# Patient Record
Sex: FEMALE | Race: WHITE | NOT HISPANIC OR LATINO
[De-identification: names, ages, dates, MRNs, and addresses within clinical notes are randomized per-mention and may not be internally consistent; named-entity substitution may affect disease eponyms.]

---

## 2018-10-04 ENCOUNTER — RESULT REVIEW (OUTPATIENT)
Age: 73
End: 2018-10-04

## 2019-05-03 ENCOUNTER — RECORD ABSTRACTING (OUTPATIENT)
Age: 74
End: 2019-05-03

## 2019-05-03 DIAGNOSIS — M19.90 UNSPECIFIED OSTEOARTHRITIS, UNSPECIFIED SITE: ICD-10-CM

## 2019-05-03 DIAGNOSIS — Z86.010 PERSONAL HISTORY OF COLONIC POLYPS: ICD-10-CM

## 2019-05-03 DIAGNOSIS — Z83.79 FAMILY HISTORY OF OTHER DISEASES OF THE DIGESTIVE SYSTEM: ICD-10-CM

## 2019-05-03 DIAGNOSIS — K25.9 GASTRIC ULCER, UNSPECIFIED AS ACUTE OR CHRONIC, W/OUT HEMORRHAGE OR PERFORATION: ICD-10-CM

## 2019-05-03 DIAGNOSIS — Z78.9 OTHER SPECIFIED HEALTH STATUS: ICD-10-CM

## 2019-05-03 DIAGNOSIS — E11.9 TYPE 2 DIABETES MELLITUS W/OUT COMPLICATIONS: ICD-10-CM

## 2019-05-03 DIAGNOSIS — Z87.39 PERSONAL HISTORY OF OTHER DISEASES OF THE MUSCULOSKELETAL SYSTEM AND CONNECTIVE TISSUE: ICD-10-CM

## 2019-05-03 DIAGNOSIS — K21.9 GASTRO-ESOPHAGEAL REFLUX DISEASE W/OUT ESOPHAGITIS: ICD-10-CM

## 2019-05-03 DIAGNOSIS — Z87.01 PERSONAL HISTORY OF PNEUMONIA (RECURRENT): ICD-10-CM

## 2019-05-03 RX ORDER — OLOPATADINE HYDROCHLORIDE 2 MG/ML
0.2 SOLUTION OPHTHALMIC
Refills: 0 | Status: ACTIVE | COMMUNITY

## 2019-05-17 ENCOUNTER — APPOINTMENT (OUTPATIENT)
Dept: GASTROENTEROLOGY | Facility: CLINIC | Age: 74
End: 2019-05-17
Payer: MEDICARE

## 2019-05-17 VITALS
HEART RATE: 74 BPM | TEMPERATURE: 98.2 F | HEIGHT: 64 IN | BODY MASS INDEX: 28.51 KG/M2 | SYSTOLIC BLOOD PRESSURE: 144 MMHG | WEIGHT: 167 LBS | DIASTOLIC BLOOD PRESSURE: 83 MMHG

## 2019-05-17 PROCEDURE — 99214 OFFICE O/P EST MOD 30 MIN: CPT

## 2019-05-17 NOTE — ASSESSMENT
[FreeTextEntry1] : 73 yo female with IBS of alternating constipation and diarrhea. Patient suffers from significant bloating. Will give trial of Xifaxan to see if this gives her relief. Followup in 6 weeks.

## 2019-05-17 NOTE — HISTORY OF PRESENT ILLNESS
[de-identified] : 75 yo female with long history of irritable bowel syndrome and fibromyalgia. The patient has alternating constipation diarrhea which she manages with MiraLax probiotics and fiber. There's been no other changes in her medical history. Colonoscopy done recently was normal.Issues about treatment of irritable bowel discussed with the patient.

## 2019-05-28 ENCOUNTER — RX CHANGE (OUTPATIENT)
Age: 74
End: 2019-05-28

## 2019-05-31 ENCOUNTER — RX CHANGE (OUTPATIENT)
Age: 74
End: 2019-05-31

## 2019-06-19 ENCOUNTER — APPOINTMENT (OUTPATIENT)
Dept: GASTROENTEROLOGY | Facility: CLINIC | Age: 74
End: 2019-06-19
Payer: MEDICARE

## 2019-06-19 VITALS
DIASTOLIC BLOOD PRESSURE: 95 MMHG | BODY MASS INDEX: 28.51 KG/M2 | HEART RATE: 75 BPM | SYSTOLIC BLOOD PRESSURE: 150 MMHG | WEIGHT: 167 LBS | HEIGHT: 64 IN

## 2019-06-19 PROCEDURE — 99213 OFFICE O/P EST LOW 20 MIN: CPT

## 2019-06-19 NOTE — HISTORY OF PRESENT ILLNESS
[de-identified] : 73 yo female with long history of irritable bowel including alternating constipation and diarrhea. Patient does get significant relief from the use of Librax. She does get cramping frequently. Patient is leaving on a trip out of the country. Will start medications on return.

## 2019-06-19 NOTE — ASSESSMENT
[FreeTextEntry1] : 73 yo female with IBS symptoms. CT scan abd/pelvis. Start Elavil 10 mg po qHS on return.

## 2019-06-19 NOTE — PHYSICAL EXAM
[General Appearance - Alert] : alert [General Appearance - In No Acute Distress] : in no acute distress [Auscultation Breath Sounds / Voice Sounds] : lungs were clear to auscultation bilaterally [Heart Rate And Rhythm] : heart rate was normal and rhythm regular [Heart Sounds] : normal S1 and S2 [Heart Sounds Gallop] : no gallops [Murmurs] : no murmurs [Bowel Sounds] : normal bowel sounds [Heart Sounds Pericardial Friction Rub] : no pericardial rub [Abdomen Soft] : soft [Abdomen Tenderness] : non-tender [Abdomen Mass (___ Cm)] : no abdominal mass palpated [] : no hepato-splenomegaly

## 2019-07-17 ENCOUNTER — RX RENEWAL (OUTPATIENT)
Age: 74
End: 2019-07-17

## 2019-07-30 ENCOUNTER — OUTPATIENT (OUTPATIENT)
Dept: OUTPATIENT SERVICES | Facility: HOSPITAL | Age: 74
LOS: 1 days | End: 2019-07-30
Payer: MEDICARE

## 2019-07-30 ENCOUNTER — APPOINTMENT (OUTPATIENT)
Dept: CT IMAGING | Facility: CLINIC | Age: 74
End: 2019-07-30
Payer: MEDICARE

## 2019-07-30 DIAGNOSIS — K58.9 IRRITABLE BOWEL SYNDROME WITHOUT DIARRHEA: ICD-10-CM

## 2019-07-30 PROCEDURE — 74176 CT ABD & PELVIS W/O CONTRAST: CPT | Mod: 26

## 2019-07-30 PROCEDURE — 74176 CT ABD & PELVIS W/O CONTRAST: CPT

## 2019-08-07 ENCOUNTER — APPOINTMENT (OUTPATIENT)
Dept: GASTROENTEROLOGY | Facility: CLINIC | Age: 74
End: 2019-08-07
Payer: MEDICARE

## 2019-08-07 VITALS
WEIGHT: 170 LBS | BODY MASS INDEX: 29.02 KG/M2 | HEART RATE: 83 BPM | DIASTOLIC BLOOD PRESSURE: 88 MMHG | HEIGHT: 64 IN | SYSTOLIC BLOOD PRESSURE: 153 MMHG

## 2019-08-07 DIAGNOSIS — K58.9 IRRITABLE BOWEL SYNDROME W/OUT DIARRHEA: ICD-10-CM

## 2019-08-07 PROCEDURE — 99214 OFFICE O/P EST MOD 30 MIN: CPT

## 2019-08-07 NOTE — PHYSICAL EXAM
[General Appearance - Alert] : alert [General Appearance - In No Acute Distress] : in no acute distress [Auscultation Breath Sounds / Voice Sounds] : lungs were clear to auscultation bilaterally [Heart Rate And Rhythm] : heart rate was normal and rhythm regular [Murmurs] : no murmurs [Heart Sounds Gallop] : no gallops [Heart Sounds] : normal S1 and S2 [Heart Sounds Pericardial Friction Rub] : no pericardial rub [Bowel Sounds] : normal bowel sounds [Abdomen Soft] : soft [] : no hepato-splenomegaly [Abdomen Tenderness] : non-tender [Abdomen Mass (___ Cm)] : no abdominal mass palpated

## 2019-08-07 NOTE — HISTORY OF PRESENT ILLNESS
[de-identified] : Ms. ALBERT RIVER is a 74 year old female with history of ureteral bowel characterized by alternating constipation and diarrhea. Patient has underlying anxiety disorder and also has problems with fibromyalgia. Multiple medications have been ineffective. Workup including colonoscopy laboratory tests CT scan have all been unremarkable.\par

## 2019-08-07 NOTE — ASSESSMENT
[FreeTextEntry1] : 73 yo female with history of IBS and anxiety disorder. Will refer patient for evaluation by psychotherapy and dietary. Follow up in three months.

## 2019-08-13 ENCOUNTER — RX RENEWAL (OUTPATIENT)
Age: 74
End: 2019-08-13

## 2019-09-04 ENCOUNTER — RX RENEWAL (OUTPATIENT)
Age: 74
End: 2019-09-04

## 2019-09-08 ENCOUNTER — TRANSCRIPTION ENCOUNTER (OUTPATIENT)
Age: 74
End: 2019-09-08

## 2019-09-10 ENCOUNTER — RX RENEWAL (OUTPATIENT)
Age: 74
End: 2019-09-10

## 2019-12-14 ENCOUNTER — TRANSCRIPTION ENCOUNTER (OUTPATIENT)
Age: 74
End: 2019-12-14

## 2021-09-20 ENCOUNTER — TRANSCRIPTION ENCOUNTER (OUTPATIENT)
Age: 76
End: 2021-09-20

## 2021-09-27 ENCOUNTER — NON-APPOINTMENT (OUTPATIENT)
Age: 76
End: 2021-09-27

## 2021-09-27 PROBLEM — Z76.89 ESTABLISHING CARE WITH NEW DOCTOR, ENCOUNTER FOR: Status: ACTIVE | Noted: 2021-09-27

## 2021-09-28 ENCOUNTER — APPOINTMENT (OUTPATIENT)
Dept: GYNECOLOGIC ONCOLOGY | Facility: CLINIC | Age: 76
End: 2021-09-28
Payer: MEDICARE

## 2021-09-28 ENCOUNTER — RESULT REVIEW (OUTPATIENT)
Age: 76
End: 2021-09-28

## 2021-09-28 VITALS
HEIGHT: 64 IN | HEART RATE: 73 BPM | DIASTOLIC BLOOD PRESSURE: 83 MMHG | BODY MASS INDEX: 30.56 KG/M2 | SYSTOLIC BLOOD PRESSURE: 156 MMHG | WEIGHT: 179 LBS

## 2021-09-28 DIAGNOSIS — F41.9 ANXIETY DISORDER, UNSPECIFIED: ICD-10-CM

## 2021-09-28 DIAGNOSIS — Z76.89 PERSONS ENCOUNTERING HEALTH SERVICES IN OTHER SPECIFIED CIRCUMSTANCES: ICD-10-CM

## 2021-09-28 DIAGNOSIS — E78.00 PURE HYPERCHOLESTEROLEMIA, UNSPECIFIED: ICD-10-CM

## 2021-09-28 DIAGNOSIS — M35.3 POLYMYALGIA RHEUMATICA: ICD-10-CM

## 2021-09-28 DIAGNOSIS — Z98.890 OTHER SPECIFIED POSTPROCEDURAL STATES: ICD-10-CM

## 2021-09-28 DIAGNOSIS — E11.9 TYPE 2 DIABETES MELLITUS W/OUT COMPLICATIONS: ICD-10-CM

## 2021-09-28 DIAGNOSIS — J30.9 ALLERGIC RHINITIS, UNSPECIFIED: ICD-10-CM

## 2021-09-28 PROCEDURE — 99205 OFFICE O/P NEW HI 60 MIN: CPT

## 2021-09-28 RX ORDER — CONJUGATED ESTROGENS 0.62 MG/G
0.62 CREAM VAGINAL
Refills: 0 | Status: COMPLETED | COMMUNITY
End: 2021-09-28

## 2021-09-28 RX ORDER — SODIUM SULFATE, POTASSIUM SULFATE, MAGNESIUM SULFATE 17.5; 3.13; 1.6 G/ML; G/ML; G/ML
17.5-3.13-1.6 SOLUTION, CONCENTRATE ORAL
Refills: 0 | Status: COMPLETED | COMMUNITY
End: 2021-09-28

## 2021-09-28 RX ORDER — PREDNISONE 1 MG/1
1 TABLET ORAL
Qty: 3 | Refills: 0 | Status: ACTIVE | COMMUNITY
Start: 2021-09-28

## 2021-09-30 ENCOUNTER — OUTPATIENT (OUTPATIENT)
Dept: OUTPATIENT SERVICES | Facility: HOSPITAL | Age: 76
LOS: 1 days | End: 2021-09-30
Payer: MEDICARE

## 2021-09-30 ENCOUNTER — APPOINTMENT (OUTPATIENT)
Dept: CT IMAGING | Facility: CLINIC | Age: 76
End: 2021-09-30
Payer: MEDICARE

## 2021-09-30 DIAGNOSIS — R19.00 INTRA-ABDOMINAL AND PELVIC SWELLING, MASS AND LUMP, UNSPECIFIED SITE: ICD-10-CM

## 2021-09-30 PROCEDURE — 71260 CT THORAX DX C+: CPT | Mod: 26,MH

## 2021-09-30 PROCEDURE — 74177 CT ABD & PELVIS W/CONTRAST: CPT | Mod: 26,MH

## 2021-09-30 PROCEDURE — 71260 CT THORAX DX C+: CPT

## 2021-09-30 PROCEDURE — 74177 CT ABD & PELVIS W/CONTRAST: CPT

## 2021-10-04 PROBLEM — E11.9 TYPE 2 DIABETES MELLITUS: Status: ACTIVE | Noted: 2021-10-04

## 2021-10-04 PROBLEM — E78.00 HYPERCHOLESTEROLEMIA: Status: ACTIVE | Noted: 2021-10-04

## 2021-10-04 PROBLEM — J30.9 ALLERGIC RHINITIS: Status: ACTIVE | Noted: 2021-10-04

## 2021-10-04 PROBLEM — F41.9 ANXIETY: Status: ACTIVE | Noted: 2021-10-04

## 2021-10-04 RX ORDER — AMITRIPTYLINE HYDROCHLORIDE 10 MG/1
10 TABLET, FILM COATED ORAL
Qty: 90 | Refills: 3 | Status: COMPLETED | COMMUNITY
Start: 2019-06-19 | End: 2021-10-04

## 2021-10-04 RX ORDER — AZELASTINE HYDROCHLORIDE 137 UG/1
137 SPRAY, METERED NASAL
Refills: 0 | Status: ACTIVE | COMMUNITY
Start: 2021-10-04

## 2021-10-04 RX ORDER — LEVOCETIRIZINE DIHYDROCHLORIDE 5 MG/1
5 TABLET ORAL DAILY
Refills: 0 | Status: ACTIVE | COMMUNITY
Start: 2021-10-04

## 2021-10-04 RX ORDER — FLUTICASONE PROPIONATE 50 UG/1
50 SPRAY, METERED NASAL
Refills: 0 | Status: ACTIVE | COMMUNITY
Start: 2021-10-04

## 2021-10-04 RX ORDER — NITROGLYCERIN 0.3 MG/1
0.3 TABLET SUBLINGUAL
Refills: 0 | Status: ACTIVE | COMMUNITY
Start: 2021-10-04

## 2021-10-04 RX ORDER — GABAPENTIN 300 MG/1
300 CAPSULE ORAL
Refills: 0 | Status: ACTIVE | COMMUNITY
Start: 2021-10-04

## 2021-10-04 RX ORDER — FUROSEMIDE 20 MG/1
20 TABLET ORAL
Refills: 0 | Status: ACTIVE | COMMUNITY
Start: 2021-10-04

## 2021-10-04 RX ORDER — CELECOXIB 200 MG/1
200 CAPSULE ORAL
Refills: 0 | Status: COMPLETED | COMMUNITY
Start: 2021-10-04 | End: 2021-10-04

## 2021-10-04 RX ORDER — RIFAXIMIN 550 MG/1
550 TABLET ORAL
Qty: 42 | Refills: 0 | Status: COMPLETED | COMMUNITY
Start: 2019-05-28 | End: 2021-10-04

## 2021-10-04 RX ORDER — ONDANSETRON 8 MG/1
8 TABLET, ORALLY DISINTEGRATING ORAL
Refills: 0 | Status: COMPLETED | COMMUNITY
End: 2021-10-04

## 2021-10-04 RX ORDER — ATORVASTATIN CALCIUM 20 MG/1
20 TABLET, FILM COATED ORAL
Refills: 0 | Status: ACTIVE | COMMUNITY
Start: 2021-10-04

## 2021-10-04 RX ORDER — CHLORDIAZEPOXIDE HYDROCHLORIDE AND CLIDINIUM BROMIDE 5; 2.5 MG/1; MG/1
5-2.5 CAPSULE, GELATIN COATED ORAL
Qty: 90 | Refills: 0 | Status: COMPLETED | COMMUNITY
Start: 2019-07-17 | End: 2021-10-04

## 2021-10-04 RX ORDER — RIFAXIMIN 550 MG/1
550 TABLET ORAL 3 TIMES DAILY
Qty: 42 | Refills: 0 | Status: COMPLETED | COMMUNITY
Start: 2019-05-17 | End: 2021-10-04

## 2021-10-04 RX ORDER — OLOPATADINE HYDROCHLORIDE 600 UG/1
0.6 SPRAY, METERED NASAL
Refills: 0 | Status: COMPLETED | COMMUNITY
End: 2021-10-04

## 2021-10-04 RX ORDER — CARVEDILOL 6.25 MG/1
6.25 TABLET, FILM COATED ORAL TWICE DAILY
Refills: 0 | Status: ACTIVE | COMMUNITY
Start: 2021-10-04

## 2021-10-04 RX ORDER — METFORMIN ER 500 MG 500 MG/1
500 TABLET ORAL DAILY
Refills: 0 | Status: ACTIVE | COMMUNITY
Start: 2021-10-04

## 2021-10-04 RX ORDER — PRAMOXINE HYDROCHLORIDE AND HYDROCORTISONE ACETATE 10; 25 MG/G; MG/G
2.5-1 CREAM TOPICAL
Refills: 0 | Status: COMPLETED | COMMUNITY
End: 2021-10-04

## 2021-10-04 RX ORDER — FLUNISOLIDE 0.25 MG/ML
25 MCG/ACT SOLUTION NASAL
Refills: 0 | Status: COMPLETED | COMMUNITY
End: 2021-10-04

## 2021-10-04 RX ORDER — PANTOPRAZOLE SODIUM 40 MG/1
40 TABLET, DELAYED RELEASE ORAL DAILY
Refills: 0 | Status: ACTIVE | COMMUNITY
Start: 2021-10-04

## 2021-10-04 RX ORDER — LORATADINE 10 MG
17 TABLET,DISINTEGRATING ORAL
Refills: 0 | Status: ACTIVE | COMMUNITY
Start: 2021-10-04

## 2021-10-04 RX ORDER — PANTOPRAZOLE 40 MG/1
40 TABLET, DELAYED RELEASE ORAL
Qty: 180 | Refills: 0 | Status: COMPLETED | COMMUNITY
Start: 2019-09-04 | End: 2021-10-04

## 2021-10-05 ENCOUNTER — APPOINTMENT (OUTPATIENT)
Dept: DISASTER EMERGENCY | Facility: CLINIC | Age: 76
End: 2021-10-05

## 2021-10-05 DIAGNOSIS — Z01.818 ENCOUNTER FOR OTHER PREPROCEDURAL EXAMINATION: ICD-10-CM

## 2021-10-06 ENCOUNTER — OUTPATIENT (OUTPATIENT)
Dept: OUTPATIENT SERVICES | Facility: HOSPITAL | Age: 76
LOS: 1 days | End: 2021-10-06
Payer: MEDICARE

## 2021-10-06 ENCOUNTER — NON-APPOINTMENT (OUTPATIENT)
Age: 76
End: 2021-10-06

## 2021-10-06 VITALS
WEIGHT: 175.05 LBS | SYSTOLIC BLOOD PRESSURE: 130 MMHG | RESPIRATION RATE: 16 BRPM | HEART RATE: 80 BPM | OXYGEN SATURATION: 98 % | HEIGHT: 64 IN | DIASTOLIC BLOOD PRESSURE: 84 MMHG | TEMPERATURE: 98 F

## 2021-10-06 DIAGNOSIS — R19.00 INTRA-ABDOMINAL AND PELVIC SWELLING, MASS AND LUMP, UNSPECIFIED SITE: ICD-10-CM

## 2021-10-06 DIAGNOSIS — M35.3 POLYMYALGIA RHEUMATICA: ICD-10-CM

## 2021-10-06 DIAGNOSIS — Z90.49 ACQUIRED ABSENCE OF OTHER SPECIFIED PARTS OF DIGESTIVE TRACT: Chronic | ICD-10-CM

## 2021-10-06 DIAGNOSIS — R59.9 ENLARGED LYMPH NODES, UNSPECIFIED: Chronic | ICD-10-CM

## 2021-10-06 DIAGNOSIS — Z91.040 LATEX ALLERGY STATUS: ICD-10-CM

## 2021-10-06 DIAGNOSIS — D25.9 LEIOMYOMA OF UTERUS, UNSPECIFIED: ICD-10-CM

## 2021-10-06 DIAGNOSIS — N63.10 UNSPECIFIED LUMP IN THE RIGHT BREAST, UNSPECIFIED QUADRANT: Chronic | ICD-10-CM

## 2021-10-06 LAB
A1C WITH ESTIMATED AVERAGE GLUCOSE RESULT: 6.5 % — HIGH (ref 4–5.6)
ANION GAP SERPL CALC-SCNC: 13 MMOL/L — SIGNIFICANT CHANGE UP (ref 7–14)
APPEARANCE UR: CLEAR — SIGNIFICANT CHANGE UP
BILIRUB UR-MCNC: NEGATIVE — SIGNIFICANT CHANGE UP
BLD GP AB SCN SERPL QL: NEGATIVE — SIGNIFICANT CHANGE UP
BUN SERPL-MCNC: 19 MG/DL — SIGNIFICANT CHANGE UP (ref 7–23)
CALCIUM SERPL-MCNC: 9.7 MG/DL — SIGNIFICANT CHANGE UP (ref 8.4–10.5)
CHLORIDE SERPL-SCNC: 102 MMOL/L — SIGNIFICANT CHANGE UP (ref 98–107)
CO2 SERPL-SCNC: 23 MMOL/L — SIGNIFICANT CHANGE UP (ref 22–31)
COLOR SPEC: SIGNIFICANT CHANGE UP
CREAT SERPL-MCNC: 0.87 MG/DL — SIGNIFICANT CHANGE UP (ref 0.5–1.3)
DIFF PNL FLD: NEGATIVE — SIGNIFICANT CHANGE UP
ESTIMATED AVERAGE GLUCOSE: 140 — SIGNIFICANT CHANGE UP
GLUCOSE SERPL-MCNC: 140 MG/DL — HIGH (ref 70–99)
GLUCOSE UR QL: NEGATIVE — SIGNIFICANT CHANGE UP
HCT VFR BLD CALC: 42.1 % — SIGNIFICANT CHANGE UP (ref 34.5–45)
HGB BLD-MCNC: 14.1 G/DL — SIGNIFICANT CHANGE UP (ref 11.5–15.5)
KETONES UR-MCNC: NEGATIVE — SIGNIFICANT CHANGE UP
LEUKOCYTE ESTERASE UR-ACNC: NEGATIVE — SIGNIFICANT CHANGE UP
MAGNESIUM SERPL-MCNC: 1.9 MG/DL — SIGNIFICANT CHANGE UP (ref 1.6–2.6)
MCHC RBC-ENTMCNC: 30.5 PG — SIGNIFICANT CHANGE UP (ref 27–34)
MCHC RBC-ENTMCNC: 33.5 GM/DL — SIGNIFICANT CHANGE UP (ref 32–36)
MCV RBC AUTO: 90.9 FL — SIGNIFICANT CHANGE UP (ref 80–100)
NITRITE UR-MCNC: NEGATIVE — SIGNIFICANT CHANGE UP
NRBC # BLD: 0 /100 WBCS — SIGNIFICANT CHANGE UP
NRBC # FLD: 0 K/UL — SIGNIFICANT CHANGE UP
PH UR: 5.5 — SIGNIFICANT CHANGE UP (ref 5–8)
PHOSPHATE SERPL-MCNC: 3.9 MG/DL — SIGNIFICANT CHANGE UP (ref 2.5–4.5)
PLATELET # BLD AUTO: 244 K/UL — SIGNIFICANT CHANGE UP (ref 150–400)
POTASSIUM SERPL-MCNC: 4.3 MMOL/L — SIGNIFICANT CHANGE UP (ref 3.5–5.3)
POTASSIUM SERPL-SCNC: 4.3 MMOL/L — SIGNIFICANT CHANGE UP (ref 3.5–5.3)
PROT UR-MCNC: NEGATIVE — SIGNIFICANT CHANGE UP
RBC # BLD: 4.63 M/UL — SIGNIFICANT CHANGE UP (ref 3.8–5.2)
RBC # FLD: 13.6 % — SIGNIFICANT CHANGE UP (ref 10.3–14.5)
RH IG SCN BLD-IMP: POSITIVE — SIGNIFICANT CHANGE UP
SARS-COV-2 N GENE NPH QL NAA+PROBE: NOT DETECTED
SODIUM SERPL-SCNC: 138 MMOL/L — SIGNIFICANT CHANGE UP (ref 135–145)
SP GR SPEC: 1.01 — SIGNIFICANT CHANGE UP (ref 1–1.05)
UROBILINOGEN FLD QL: SIGNIFICANT CHANGE UP
WBC # BLD: 5.52 K/UL — SIGNIFICANT CHANGE UP (ref 3.8–10.5)
WBC # FLD AUTO: 5.52 K/UL — SIGNIFICANT CHANGE UP (ref 3.8–10.5)

## 2021-10-06 PROCEDURE — 93010 ELECTROCARDIOGRAM REPORT: CPT

## 2021-10-06 RX ORDER — SODIUM CHLORIDE 9 MG/ML
1000 INJECTION, SOLUTION INTRAVENOUS
Refills: 0 | Status: DISCONTINUED | OUTPATIENT
Start: 2021-10-08 | End: 2021-10-08

## 2021-10-06 RX ORDER — CHLORHEXIDINE GLUCONATE 213 G/1000ML
1 SOLUTION TOPICAL DAILY
Refills: 0 | Status: DISCONTINUED | OUTPATIENT
Start: 2021-10-08 | End: 2021-10-08

## 2021-10-06 NOTE — H&P PST ADULT - PROBLEM SELECTOR PLAN 1
This is a 77 y/o female who is scheduled for explor lap. JAVIER-BSO, possible staging, cystology on 10-8-21  * Instructed to speak with patient regarding covid testing  * Given preop and cleanser instructions with good teach back and patient verbalized understanding  * Await prearranged cardiac evaluation with cardiologist requested by surgeon and PAST office, since pcp requested.  * Await stress test from cardiologist  * Instructed to take normal am dose of   the am of surgery This is a 75 y/o female who is scheduled for explor lap. JAVIER-BSO, possible staging, cystology on 10-8-21  * Instructed to speak with patient regarding covid testing  * Given preop and cleanser instructions with good teach back and patient verbalized understanding  * Await prearranged cardiac evaluation with cardiologist requested by surgeon and PAST office, since pcp requested.  * Await stress test from cardiologist  * Instructed to take normal am dose of azelastine, gabapentin, carvedilol, pantoprazole, and prednisone the am of surgery

## 2021-10-06 NOTE — H&P PST ADULT - HISTORY OF PRESENT ILLNESS
This is a 77 y/o female who presents with finding of fibroid during routine gyn exam. Sonogram, CT scan, and MRI confirmed fibroid and pelvic mass. Scheduled for explor lap. JAVIER-BSO, possible staging, cystoscopy This is a 75 y/o female who presents with finding of fibroid during routine gyn exam. Sonogram, CT scan, and MRI confirmed fibroid. Scheduled for explor lap. JAVIER-BSO, possible staging, cystoscopy

## 2021-10-06 NOTE — H&P PST ADULT - NSICDXPASTMEDICALHX_GEN_ALL_CORE_FT
PAST MEDICAL HISTORY:  Pelvic mass October 2021     PAST MEDICAL HISTORY:  Fibroid     H/O fibromyalgia     H/O polymyalgia rheumatica     Hypertension     Lung nodule right side -- followed by Dr. Andrea Esposito, [facility] [phone] (last seen in July 2021)    Osteoarthritis     Pelvic mass October 2021     PAST MEDICAL HISTORY:  Anxiety     Fibroid     H/O fibromyalgia     H/O polymyalgia rheumatica     Hypertension     Lung nodule right side -- followed by Dr. Andrea Esposito, [facility] [phone] (last seen in July 2021)    Osteoarthritis     Pelvic mass October 2021     PAST MEDICAL HISTORY:  Anxiety     Fibroid 2021    H/O fibromyalgia     H/O polymyalgia rheumatica     Hypertension     Lung nodule right side -- followed by Dr. Andrea Esposito, [facility] [phone] (last seen in July 2021)    Osteoarthritis     Urinary urgency wears pads

## 2021-10-06 NOTE — H&P PST ADULT - NSICDXPASTSURGICALHX_GEN_ALL_CORE_FT
PAST SURGICAL HISTORY:  Breast mass, right excision of two breastt masses 20 years ago--benign    Enlarged lymph node excision of left arm lymph node -- benign    S/P appendectomy

## 2021-10-06 NOTE — H&P PST ADULT - OTHER CARE PROVIDERS
cardiologist, Dr. Catracho Caldwell or [facility] [phone]; MD following lung nodule-- Dr. Andrea Esposito, [facility] [phone]

## 2021-10-07 ENCOUNTER — TRANSCRIPTION ENCOUNTER (OUTPATIENT)
Age: 76
End: 2021-10-07

## 2021-10-07 LAB
CULTURE RESULTS: SIGNIFICANT CHANGE UP
SPECIMEN SOURCE: SIGNIFICANT CHANGE UP

## 2021-10-07 NOTE — ASU PATIENT PROFILE, ADULT - NSICDXPASTMEDICALHX_GEN_ALL_CORE_FT
PAST MEDICAL HISTORY:  Anxiety     Fibroid 2021    H/O fibromyalgia     H/O polymyalgia rheumatica     Hypertension     Lung nodule right side -- followed by Dr. Andrea Esposito, [facility] [phone] (last seen in July 2021)    Osteoarthritis     Urinary urgency wears pads

## 2021-10-08 ENCOUNTER — RESULT REVIEW (OUTPATIENT)
Age: 76
End: 2021-10-08

## 2021-10-08 ENCOUNTER — INPATIENT (INPATIENT)
Facility: HOSPITAL | Age: 76
LOS: 2 days | Discharge: HOME | End: 2021-10-11
Attending: OBSTETRICS & GYNECOLOGY | Admitting: OBSTETRICS & GYNECOLOGY
Payer: MEDICARE

## 2021-10-08 ENCOUNTER — APPOINTMENT (OUTPATIENT)
Dept: GYNECOLOGIC ONCOLOGY | Facility: HOSPITAL | Age: 76
End: 2021-10-08

## 2021-10-08 ENCOUNTER — TRANSCRIPTION ENCOUNTER (OUTPATIENT)
Age: 76
End: 2021-10-08

## 2021-10-08 VITALS
TEMPERATURE: 98 F | HEART RATE: 68 BPM | WEIGHT: 175.05 LBS | DIASTOLIC BLOOD PRESSURE: 68 MMHG | HEIGHT: 64 IN | OXYGEN SATURATION: 94 % | RESPIRATION RATE: 16 BRPM | SYSTOLIC BLOOD PRESSURE: 156 MMHG

## 2021-10-08 DIAGNOSIS — R19.00 INTRA-ABDOMINAL AND PELVIC SWELLING, MASS AND LUMP, UNSPECIFIED SITE: ICD-10-CM

## 2021-10-08 DIAGNOSIS — N63.10 UNSPECIFIED LUMP IN THE RIGHT BREAST, UNSPECIFIED QUADRANT: Chronic | ICD-10-CM

## 2021-10-08 DIAGNOSIS — R59.9 ENLARGED LYMPH NODES, UNSPECIFIED: Chronic | ICD-10-CM

## 2021-10-08 DIAGNOSIS — Z90.49 ACQUIRED ABSENCE OF OTHER SPECIFIED PARTS OF DIGESTIVE TRACT: Chronic | ICD-10-CM

## 2021-10-08 LAB
ANION GAP SERPL CALC-SCNC: 11 MMOL/L — SIGNIFICANT CHANGE UP (ref 7–14)
BUN SERPL-MCNC: 18 MG/DL — SIGNIFICANT CHANGE UP (ref 7–23)
CALCIUM SERPL-MCNC: 8.6 MG/DL — SIGNIFICANT CHANGE UP (ref 8.4–10.5)
CHLORIDE SERPL-SCNC: 105 MMOL/L — SIGNIFICANT CHANGE UP (ref 98–107)
CO2 SERPL-SCNC: 23 MMOL/L — SIGNIFICANT CHANGE UP (ref 22–31)
CREAT SERPL-MCNC: 0.73 MG/DL — SIGNIFICANT CHANGE UP (ref 0.5–1.3)
GLUCOSE BLDC GLUCOMTR-MCNC: 126 MG/DL — HIGH (ref 70–99)
GLUCOSE BLDC GLUCOMTR-MCNC: 164 MG/DL — HIGH (ref 70–99)
GLUCOSE BLDC GLUCOMTR-MCNC: 171 MG/DL — HIGH (ref 70–99)
GLUCOSE BLDC GLUCOMTR-MCNC: 89 MG/DL — SIGNIFICANT CHANGE UP (ref 70–99)
GLUCOSE SERPL-MCNC: 200 MG/DL — HIGH (ref 70–99)
HCT VFR BLD CALC: 41.9 % — SIGNIFICANT CHANGE UP (ref 34.5–45)
HGB BLD-MCNC: 13.7 G/DL — SIGNIFICANT CHANGE UP (ref 11.5–15.5)
MCHC RBC-ENTMCNC: 30.8 PG — SIGNIFICANT CHANGE UP (ref 27–34)
MCHC RBC-ENTMCNC: 32.7 GM/DL — SIGNIFICANT CHANGE UP (ref 32–36)
MCV RBC AUTO: 94.2 FL — SIGNIFICANT CHANGE UP (ref 80–100)
NRBC # BLD: 0 /100 WBCS — SIGNIFICANT CHANGE UP
NRBC # FLD: 0 K/UL — SIGNIFICANT CHANGE UP
PLATELET # BLD AUTO: 253 K/UL — SIGNIFICANT CHANGE UP (ref 150–400)
POTASSIUM SERPL-MCNC: 4.8 MMOL/L — SIGNIFICANT CHANGE UP (ref 3.5–5.3)
POTASSIUM SERPL-SCNC: 4.8 MMOL/L — SIGNIFICANT CHANGE UP (ref 3.5–5.3)
RBC # BLD: 4.45 M/UL — SIGNIFICANT CHANGE UP (ref 3.8–5.2)
RBC # FLD: 13.3 % — SIGNIFICANT CHANGE UP (ref 10.3–14.5)
RH IG SCN BLD-IMP: POSITIVE — SIGNIFICANT CHANGE UP
SODIUM SERPL-SCNC: 139 MMOL/L — SIGNIFICANT CHANGE UP (ref 135–145)
WBC # BLD: 12.13 K/UL — HIGH (ref 3.8–10.5)
WBC # FLD AUTO: 12.13 K/UL — HIGH (ref 3.8–10.5)

## 2021-10-08 PROCEDURE — 88331 PATH CONSLTJ SURG 1 BLK 1SPC: CPT | Mod: 26

## 2021-10-08 PROCEDURE — 88305 TISSUE EXAM BY PATHOLOGIST: CPT | Mod: 26

## 2021-10-08 PROCEDURE — 88307 TISSUE EXAM BY PATHOLOGIST: CPT | Mod: 26

## 2021-10-08 PROCEDURE — 88342 IMHCHEM/IMCYTCHM 1ST ANTB: CPT | Mod: 26

## 2021-10-08 PROCEDURE — 58150 TOTAL HYSTERECTOMY: CPT

## 2021-10-08 PROCEDURE — 88341 IMHCHEM/IMCYTCHM EA ADD ANTB: CPT | Mod: 26

## 2021-10-08 RX ORDER — FLUTICASONE PROPIONATE 50 MCG
1 SPRAY, SUSPENSION NASAL
Qty: 0 | Refills: 0 | DISCHARGE

## 2021-10-08 RX ORDER — ATORVASTATIN CALCIUM 80 MG/1
1 TABLET, FILM COATED ORAL
Qty: 0 | Refills: 0 | DISCHARGE

## 2021-10-08 RX ORDER — DEXTROSE 50 % IN WATER 50 %
12.5 SYRINGE (ML) INTRAVENOUS ONCE
Refills: 0 | Status: DISCONTINUED | OUTPATIENT
Start: 2021-10-08 | End: 2021-10-11

## 2021-10-08 RX ORDER — PANTOPRAZOLE SODIUM 20 MG/1
1 TABLET, DELAYED RELEASE ORAL
Qty: 0 | Refills: 0 | DISCHARGE

## 2021-10-08 RX ORDER — HYOSCYAMINE SULFATE 0.13 MG
0 TABLET ORAL
Qty: 0 | Refills: 0 | DISCHARGE

## 2021-10-08 RX ORDER — DULOXETINE HYDROCHLORIDE 30 MG/1
60 CAPSULE, DELAYED RELEASE ORAL DAILY
Refills: 0 | Status: DISCONTINUED | OUTPATIENT
Start: 2021-10-08 | End: 2021-10-11

## 2021-10-08 RX ORDER — CARVEDILOL PHOSPHATE 80 MG/1
1 CAPSULE, EXTENDED RELEASE ORAL
Qty: 0 | Refills: 0 | DISCHARGE

## 2021-10-08 RX ORDER — DEXAMETHASONE 0.5 MG/5ML
4 ELIXIR ORAL EVERY 6 HOURS
Refills: 0 | Status: DISCONTINUED | OUTPATIENT
Start: 2021-10-08 | End: 2021-10-09

## 2021-10-08 RX ORDER — UBIDECARENONE 100 MG
1 CAPSULE ORAL
Qty: 0 | Refills: 0 | DISCHARGE

## 2021-10-08 RX ORDER — INSULIN LISPRO 100/ML
VIAL (ML) SUBCUTANEOUS
Refills: 0 | Status: DISCONTINUED | OUTPATIENT
Start: 2021-10-08 | End: 2021-10-11

## 2021-10-08 RX ORDER — NITROGLYCERIN 6.5 MG
1 CAPSULE, EXTENDED RELEASE ORAL
Qty: 0 | Refills: 0 | DISCHARGE

## 2021-10-08 RX ORDER — ACETAMINOPHEN 500 MG
100 TABLET ORAL
Qty: 0 | Refills: 0 | DISCHARGE
Start: 2021-10-08

## 2021-10-08 RX ORDER — OMEGA-3 ACID ETHYL ESTERS 1 G
1 CAPSULE ORAL
Qty: 0 | Refills: 0 | DISCHARGE

## 2021-10-08 RX ORDER — INFLUENZA VIRUS VACCINE 15; 15; 15; 15 UG/.5ML; UG/.5ML; UG/.5ML; UG/.5ML
0.7 SUSPENSION INTRAMUSCULAR ONCE
Refills: 0 | Status: COMPLETED | OUTPATIENT
Start: 2021-10-08 | End: 2021-10-11

## 2021-10-08 RX ORDER — SODIUM CHLORIDE 9 MG/ML
1000 INJECTION, SOLUTION INTRAVENOUS
Refills: 0 | Status: DISCONTINUED | OUTPATIENT
Start: 2021-10-08 | End: 2021-10-10

## 2021-10-08 RX ORDER — ACETAMINOPHEN 500 MG
3 TABLET ORAL
Qty: 0 | Refills: 0 | DISCHARGE
Start: 2021-10-08

## 2021-10-08 RX ORDER — METFORMIN HYDROCHLORIDE 850 MG/1
2 TABLET ORAL
Qty: 0 | Refills: 0 | DISCHARGE

## 2021-10-08 RX ORDER — NALOXONE HYDROCHLORIDE 4 MG/.1ML
0.1 SPRAY NASAL
Refills: 0 | Status: DISCONTINUED | OUTPATIENT
Start: 2021-10-08 | End: 2021-10-11

## 2021-10-08 RX ORDER — SIMETHICONE 80 MG/1
80 TABLET, CHEWABLE ORAL DAILY
Refills: 0 | Status: DISCONTINUED | OUTPATIENT
Start: 2021-10-08 | End: 2021-10-11

## 2021-10-08 RX ORDER — MONTELUKAST 4 MG/1
1 TABLET, CHEWABLE ORAL
Qty: 0 | Refills: 0 | DISCHARGE

## 2021-10-08 RX ORDER — ONDANSETRON 8 MG/1
4 TABLET, FILM COATED ORAL EVERY 6 HOURS
Refills: 0 | Status: DISCONTINUED | OUTPATIENT
Start: 2021-10-08 | End: 2021-10-09

## 2021-10-08 RX ORDER — PREGABALIN 225 MG/1
1 CAPSULE ORAL
Qty: 0 | Refills: 0 | DISCHARGE

## 2021-10-08 RX ORDER — ACETAMINOPHEN 500 MG
975 TABLET ORAL EVERY 6 HOURS
Refills: 0 | Status: DISCONTINUED | OUTPATIENT
Start: 2021-10-08 | End: 2021-10-11

## 2021-10-08 RX ORDER — HEPARIN SODIUM 5000 [USP'U]/ML
5000 INJECTION INTRAVENOUS; SUBCUTANEOUS EVERY 8 HOURS
Refills: 0 | Status: DISCONTINUED | OUTPATIENT
Start: 2021-10-08 | End: 2021-10-09

## 2021-10-08 RX ORDER — MONTELUKAST 4 MG/1
10 TABLET, CHEWABLE ORAL AT BEDTIME
Refills: 0 | Status: DISCONTINUED | OUTPATIENT
Start: 2021-10-08 | End: 2021-10-11

## 2021-10-08 RX ORDER — POLYETHYLENE GLYCOL 3350 17 G/17G
0 POWDER, FOR SOLUTION ORAL
Qty: 0 | Refills: 0 | DISCHARGE

## 2021-10-08 RX ORDER — GABAPENTIN 400 MG/1
1 CAPSULE ORAL
Qty: 0 | Refills: 0 | DISCHARGE

## 2021-10-08 RX ORDER — OXYCODONE HYDROCHLORIDE 5 MG/1
1 TABLET ORAL
Qty: 15 | Refills: 0
Start: 2021-10-08 | End: 2021-10-11

## 2021-10-08 RX ORDER — ALPRAZOLAM 0.25 MG
1 TABLET ORAL
Qty: 0 | Refills: 0 | DISCHARGE

## 2021-10-08 RX ORDER — GLUCAGON INJECTION, SOLUTION 0.5 MG/.1ML
1 INJECTION, SOLUTION SUBCUTANEOUS ONCE
Refills: 0 | Status: DISCONTINUED | OUTPATIENT
Start: 2021-10-08 | End: 2021-10-11

## 2021-10-08 RX ORDER — INSULIN LISPRO 100/ML
VIAL (ML) SUBCUTANEOUS AT BEDTIME
Refills: 0 | Status: DISCONTINUED | OUTPATIENT
Start: 2021-10-08 | End: 2021-10-11

## 2021-10-08 RX ORDER — LEVOCETIRIZINE DIHYDROCHLORIDE 0.5 MG/ML
1 SOLUTION ORAL
Qty: 0 | Refills: 0 | DISCHARGE

## 2021-10-08 RX ORDER — CALCIUM POLYCARBOPHIL 625 MG/1
1 TABLET, FILM COATED ORAL
Qty: 0 | Refills: 0 | DISCHARGE

## 2021-10-08 RX ORDER — INFLUENZA VIRUS VACCINE 15; 15; 15; 15 UG/.5ML; UG/.5ML; UG/.5ML; UG/.5ML
0.5 SUSPENSION INTRAMUSCULAR ONCE
Refills: 0 | Status: DISCONTINUED | OUTPATIENT
Start: 2021-10-08 | End: 2021-10-08

## 2021-10-08 RX ORDER — DEXTROSE 50 % IN WATER 50 %
25 SYRINGE (ML) INTRAVENOUS ONCE
Refills: 0 | Status: DISCONTINUED | OUTPATIENT
Start: 2021-10-08 | End: 2021-10-11

## 2021-10-08 RX ORDER — DULOXETINE HYDROCHLORIDE 30 MG/1
2 CAPSULE, DELAYED RELEASE ORAL
Qty: 0 | Refills: 0 | DISCHARGE

## 2021-10-08 RX ORDER — ONDANSETRON 8 MG/1
4 TABLET, FILM COATED ORAL ONCE
Refills: 0 | Status: COMPLETED | OUTPATIENT
Start: 2021-10-08 | End: 2021-10-08

## 2021-10-08 RX ORDER — ACETAMINOPHEN 500 MG
1000 TABLET ORAL ONCE
Refills: 0 | Status: DISCONTINUED | OUTPATIENT
Start: 2021-10-08 | End: 2021-10-09

## 2021-10-08 RX ORDER — HYDROMORPHONE HYDROCHLORIDE 2 MG/ML
0.5 INJECTION INTRAMUSCULAR; INTRAVENOUS; SUBCUTANEOUS
Refills: 0 | Status: DISCONTINUED | OUTPATIENT
Start: 2021-10-08 | End: 2021-10-08

## 2021-10-08 RX ORDER — MORPHINE SULFATE 50 MG/1
0.2 CAPSULE, EXTENDED RELEASE ORAL ONCE
Refills: 0 | Status: DISCONTINUED | OUTPATIENT
Start: 2021-10-08 | End: 2021-10-09

## 2021-10-08 RX ORDER — SODIUM CHLORIDE 9 MG/ML
1000 INJECTION, SOLUTION INTRAVENOUS
Refills: 0 | Status: DISCONTINUED | OUTPATIENT
Start: 2021-10-08 | End: 2021-10-11

## 2021-10-08 RX ORDER — METOPROLOL TARTRATE 50 MG
5 TABLET ORAL EVERY 6 HOURS
Refills: 0 | Status: DISCONTINUED | OUTPATIENT
Start: 2021-10-08 | End: 2021-10-11

## 2021-10-08 RX ORDER — KETOROLAC TROMETHAMINE 30 MG/ML
15 SYRINGE (ML) INJECTION EVERY 6 HOURS
Refills: 0 | Status: DISCONTINUED | OUTPATIENT
Start: 2021-10-08 | End: 2021-10-09

## 2021-10-08 RX ORDER — IBUPROFEN 200 MG
1 TABLET ORAL
Qty: 0 | Refills: 0 | DISCHARGE

## 2021-10-08 RX ORDER — DEXTROSE 50 % IN WATER 50 %
15 SYRINGE (ML) INTRAVENOUS ONCE
Refills: 0 | Status: DISCONTINUED | OUTPATIENT
Start: 2021-10-08 | End: 2021-10-11

## 2021-10-08 RX ORDER — ASPIRIN/CALCIUM CARB/MAGNESIUM 324 MG
1 TABLET ORAL
Qty: 0 | Refills: 0 | DISCHARGE

## 2021-10-08 RX ORDER — LACTOBACILLUS ACIDOPHILUS 100MM CELL
1 CAPSULE ORAL
Qty: 0 | Refills: 0 | DISCHARGE

## 2021-10-08 RX ORDER — AZELASTINE 137 UG/1
2 SPRAY, METERED NASAL
Qty: 0 | Refills: 0 | DISCHARGE

## 2021-10-08 RX ORDER — DICLOFENAC SODIUM 30 MG/G
0 GEL TOPICAL
Qty: 0 | Refills: 0 | DISCHARGE

## 2021-10-08 RX ORDER — GABAPENTIN 400 MG/1
300 CAPSULE ORAL DAILY
Refills: 0 | Status: DISCONTINUED | OUTPATIENT
Start: 2021-10-08 | End: 2021-10-11

## 2021-10-08 RX ADMIN — ONDANSETRON 4 MILLIGRAM(S): 8 TABLET, FILM COATED ORAL at 14:41

## 2021-10-08 RX ADMIN — Medication 975 MILLIGRAM(S): at 23:44

## 2021-10-08 RX ADMIN — Medication 1: at 16:13

## 2021-10-08 RX ADMIN — HYDROMORPHONE HYDROCHLORIDE 0.5 MILLIGRAM(S): 2 INJECTION INTRAMUSCULAR; INTRAVENOUS; SUBCUTANEOUS at 14:41

## 2021-10-08 RX ADMIN — MONTELUKAST 10 MILLIGRAM(S): 4 TABLET, CHEWABLE ORAL at 23:24

## 2021-10-08 RX ADMIN — Medication 15 MILLIGRAM(S): at 17:39

## 2021-10-08 RX ADMIN — SODIUM CHLORIDE 125 MILLILITER(S): 9 INJECTION, SOLUTION INTRAVENOUS at 17:38

## 2021-10-08 RX ADMIN — SODIUM CHLORIDE 125 MILLILITER(S): 9 INJECTION, SOLUTION INTRAVENOUS at 14:09

## 2021-10-08 RX ADMIN — HEPARIN SODIUM 5000 UNIT(S): 5000 INJECTION INTRAVENOUS; SUBCUTANEOUS at 23:49

## 2021-10-08 RX ADMIN — HEPARIN SODIUM 5000 UNIT(S): 5000 INJECTION INTRAVENOUS; SUBCUTANEOUS at 16:13

## 2021-10-08 RX ADMIN — Medication 15 MILLIGRAM(S): at 23:46

## 2021-10-08 NOTE — DISCHARGE NOTE PROVIDER - REASON FOR ADMISSION
Note Text (......Xxx Chief Complaint.): This diagnosis correlates with the Other (Free Text): Hx of herpes zoster, sciatic nerve pain Detail Level: Zone Other (Free Text): Lesion was anesthetized with 0.5cc of 2% lido with epi prior to LN2 scheduled surgery

## 2021-10-08 NOTE — DISCHARGE NOTE PROVIDER - NSDCCPTREATMENT_GEN_ALL_CORE_FT
PRINCIPAL PROCEDURE  Procedure: Total abdominal hysterectomy and bilateral salpingo-oophorectomy  Findings and Treatment:       SECONDARY PROCEDURE  Procedure: Exploratory laparotomy  Findings and Treatment:

## 2021-10-08 NOTE — BRIEF OPERATIVE NOTE - NSICDXBRIEFPROCEDURE_GEN_ALL_CORE_FT
PROCEDURES:  Total abdominal hysterectomy and bilateral salpingo-oophorectomy 08-Oct-2021 13:47:29  Angelo Garza  Exploratory laparotomy 08-Oct-2021 13:47:38  Angelo Garza

## 2021-10-08 NOTE — DISCHARGE NOTE PROVIDER - CARE PROVIDER_API CALL
Balbina Major)  Gynecologic Oncology; Obstetrics and Gynecology  [address] [place] [address]  Phone: [phone]  Fax: [phone]  Scheduled Appointment: 11/02/2021 09:45 AM

## 2021-10-08 NOTE — CHART NOTE - NSCHARTNOTEFT_GEN_A_CORE
PA POST OP NOTE:       SUBJECTIVE:  Patient seen and examined at bedside. Patient was asleep but easily arroused. Reports pain is under good control at this time. Denies c/o nausea, vomiting, sob, cp or palpitations. Pt has not yet attempted PO.     Vital Signs Last 24 Hrs  T(C): 37 (08 Oct 2021 13:20), Max: 37 (08 Oct 2021 13:20)  T(F): 98.6 (08 Oct 2021 13:20), Max: 98.6 (08 Oct 2021 13:20)  HR: 69 (08 Oct 2021 15:00) (68 - 73)  BP: 117/56 (08 Oct 2021 15:00) (116/77 - 156/68)  BP(mean): 72 (08 Oct 2021 15:00) (71 - 85)  RR: 18 (08 Oct 2021 15:00) (13 - 18)  SpO2: 94% (08 Oct 2021 15:00) (94% - 97%)    PHYSICAL EXAM:    LUNGS: Clear to auscultation bilaterally; No wheezing.  HEART: Regular, rate and rhythm; No murmurs.  ABDOMEN: Soft, decreased BS, nondistended and appropriately tender on palpation.  INCISION:  Dermabond Dressing intact, clean and dry. Abdominal binder on.  EXTREMITIES: No swelling or calf tenderness bilaterally. Venodynes in place.  MORELOS: Urine green tinged.    MEDICATIONS  (STANDING):  acetaminophen   Tablet .. 975 milliGRAM(s) Oral every 6 hours  acetaminophen  IVPB .. 1000 milliGRAM(s) IV Intermittent once  dextrose 40% Gel 15 Gram(s) Oral once  dextrose 5%. 1000 milliLiter(s) (50 mL/Hr) IV Continuous <Continuous>  dextrose 5%. 1000 milliLiter(s) (100 mL/Hr) IV Continuous <Continuous>  dextrose 50% Injectable 25 Gram(s) IV Push once  dextrose 50% Injectable 12.5 Gram(s) IV Push once  dextrose 50% Injectable 25 Gram(s) IV Push once  glucagon  Injectable 1 milliGRAM(s) IntraMuscular once  heparin   Injectable 5000 Unit(s) SubCutaneous every 8 hours  insulin lispro (ADMELOG) corrective regimen sliding scale   SubCutaneous three times a day before meals  insulin lispro (ADMELOG) corrective regimen sliding scale   SubCutaneous at bedtime  ketorolac   Injectable 15 milliGRAM(s) IV Push every 6 hours  lactated ringers. 1000 milliLiter(s) (125 mL/Hr) IV Continuous <Continuous>  morphine PF Spinal 0.2 milliGRAM(s) IntraThecal. once  simethicone 80 milliGRAM(s) Chew daily    MEDICATIONS  (PRN):  dexAMETHasone  Injectable 4 milliGRAM(s) IV Push every 6 hours PRN Nausea  HYDROmorphone  Injectable 0.5 milliGRAM(s) IV Push every 10 minutes PRN Moderate Pain (4 - 6)  metoprolol tartrate Injectable 5 milliGRAM(s) IV Push every 6 hours PRN Hypertension  naloxone Injectable 0.1 milliGRAM(s) IV Push every 3 minutes PRN For ANY of the following changes in patient status:  A. Breaths Per Minute LESS THAN 10, B. Oxygen saturation LESS THAN 90%, C. Sedation score of 6 for Stop After: 4 Times  ondansetron    Tablet 4 milliGRAM(s) Oral every 6 hours PRN Nausea and/or Vomiting      ASSESMENT/PLAN: 77y/o POD#0  from Exlap JAVIER,BSO in stable condition.    1. ADA Clears as tolerate. Advance in AM.  2. IVF: RL @125cc/hr.  3. Activity: Out of bed with assist.  4. Vital Signs Q routine.  5. Pulm: Continuous pulse Ox monitoring and encourage incentive spirometer use.  6. PCA as per Anesthesia.   7. LABS: CBC+BMP now in PACU and in AM.  8. Morelos to gravity. Remove in 48-72 hrs.  9. ISS and FS as ordered.  10. Continue Heparin SQ  and Venodynes for DVT prophylaxis.  11. Admit to floor and continue routine post op care.

## 2021-10-08 NOTE — DISCHARGE NOTE PROVIDER - HOSPITAL COURSE
Patient presented for scheduled surgery. Patient underwent exploratory laparotomy, total abdominal hysterectomy, bilateral salpingo-oophorectomy. Please see operative note for full details. Patient was transferred to recovery room in stable condition. Pain was controlled with PCA, Tylenol, and Toradol. Patient tolerated clear liquid diet.    ****On POD#1 patient's diet was advanced and she tolerated regular diet without any issues. Patient's pain medication was switched to oral medication. Pain was well controlled with tylenol, motrin, and oxycodone.    **On POD the patient was reaching all post-operative milestones. Patient was discharged home with close follow-up with Dr. Major in Gyn Onc office. Patient to see Dr. Major on November 2, 2021 at 9:45am. Patient presented for scheduled surgery. Patient underwent exploratory laparotomy, total abdominal hysterectomy, bilateral salpingo-oophorectomy. Please see operative note for full details. Patient was transferred to recovery room in stable condition. Pain was controlled with PCA, Tylenol, and Toradol. Patient tolerated clear liquid diet.    On POD#1 patient's diet was advanced and she tolerated regular diet without any issues. Patient's pain medication was switched to oral medication. Pain was well controlled with tylenol, motrin, and oxycodone.    On POD#2 the patient's Paul was discontinued and she voided without difficulty. She began passing flatus.     On POD#3 the patient was reaching all post-operative milestones. Patient was discharged home with close follow-up with Dr. Major in Gyn Onc office. Patient to see Dr. Major on November 2, 2021 at 9:45am.

## 2021-10-08 NOTE — DISCHARGE NOTE PROVIDER - NSDCFUADDINST_GEN_ALL_CORE_FT
Return to your regular way of eating.  Resume normal activity as tolerated, but no heavy lifting or strenuous activity for 6 weeks.  No driving for next 2 weeks and/or while on narcotic pain medication.  Complete vaginal rest, no tampons, no douching, no tub bathing, no sexual activities for 6 weeks unless otherwise instructed by your doctor.  Call your doctor with any signs and symptoms of infection such as fever, chills, nausea or vomiting.  Call your doctor with redness or swelling at the incision site, fluid leakage or wound separation.  Call your doctor if you're unable to tolerate food or have difficulty urinating.  Call your doctor if you have pain that is not relieved by your prescribed medications.  Notify your doctor with any other concerns.  Follow up with Dr. Major in Gyn Onc Office on 11/2/2021 at 9:45am.

## 2021-10-08 NOTE — ASU PREOP CHECKLIST - COMMENTS
protonix pepcid prednisone coreg sip of water 4:00am protonix pepcid prednisone gabapentin coreg sip of water 4:00am

## 2021-10-08 NOTE — DISCHARGE NOTE PROVIDER - NSDCCPCAREPLAN_GEN_ALL_CORE_FT
PRINCIPAL DISCHARGE DIAGNOSIS  Diagnosis: Uterine fibroid  Assessment and Plan of Treatment:       SECONDARY DISCHARGE DIAGNOSES  Diagnosis: Polymyalgia  Assessment and Plan of Treatment:

## 2021-10-08 NOTE — DISCHARGE NOTE PROVIDER - NSDCMRMEDTOKEN_GEN_ALL_CORE_FT
acetaminophen 10 mg/mL intravenous solution: 100 milliliter(s) intravenous once  acetaminophen 325 mg oral tablet: 3 tab(s) orally every 6 hours  Acidophilus oral tablet: 1 tab(s) orally once a day AM  ALPRAZolam 0.25 mg oral tablet: 1 tab(s) orally , As Needed  aspirin 81 mg oral tablet: 1 tab(s) orally once a day. Last dose 10/6/21.  atorvastatin 20 mg oral tablet: 1 tab(s) orally once a day PM  azelastine 137 mcg/inh (0.1%) nasal spray: 2 spray(s) nasal 2 times a day  Calcium 600+D oral tablet: 1 tab orally daily three days a week. M W F.  carvedilol 6.25 mg oral tablet: 1 tab(s) orally 2 times a day  celecoxib 200 mg oral capsule: 1 cap(s) orally once a day. Last dose 9/22/21.  chlordiazepoxide-clidinium 5 mg-2.5 mg oral capsule: 1 cap(s) orally , As Needed  CoQ10: 1 cap(s) orally once a day. Last dose 10/6/21.  diclofenac 1% topical gel: Last dose 2-3 weeks ago.  DULoxetine 30 mg oral delayed release capsule: 2 cap(s) orally once a day (at bedtime)  FiberCon 625 mg oral tablet: 1 tab(s) orally once a day AM  Fish Oil 1200 mg oral capsule: 1 cap(s) orally once a day. Last dose 10/5/21.  fluticasone 50 mcg/inh nasal spray: 1 spray(s) nasal once a day (at bedtime)  gabapentin 300 mg oral capsule: 1 cap(s) orally once a day AM  hyoscyamine 0.125 mg oral tablet: As Needed for Spasms  levocetirizine 5 mg oral tablet: 1 tab(s) orally once a day (in the evening)  metFORMIN 500 mg oral tablet, extended release: 2 tab(s) orally once a day AM.  MiraLax oral powder for reconstitution: As Needed for constipation.  montelukast 10 mg oral tablet: 1 tab(s) orally once a day (at bedtime)  Motrin 600 mg oral tablet: 1 tab(s) orally every 6 hours, As Needed  nitroglycerin 0.4 mg sublingual tablet: 1 tab(s) sublingual every 5 minutes As needed for chest pain.  oxyCODONE 5 mg oral tablet: 1 tab(s) orally every 6 hours, As Needed -for severe pain MDD:4 tablets  pantoprazole 40 mg oral delayed release tablet: 1 tab(s) orally once a day AM.  predniSONE: 3 milligram(s) orally once a day AM.  Vitamin B-12 1000 mcg oral tablet: 1 tab(s) orally once a day AM   acetaminophen 10 mg/mL intravenous solution: 100 milliliter(s) intravenous once  acetaminophen 325 mg oral tablet: 3 tab(s) orally every 6 hours  Acidophilus oral tablet: 1 tab(s) orally once a day AM  ALPRAZolam 0.25 mg oral tablet: 1 tab(s) orally , As Needed  aspirin 81 mg oral tablet: 1 tab(s) orally once a day. Last dose 10/6/21.  atorvastatin 20 mg oral tablet: 1 tab(s) orally once a day PM  azelastine 137 mcg/inh (0.1%) nasal spray: 2 spray(s) nasal 2 times a day  Calcium 600+D oral tablet: 1 tab orally daily three days a week. M W F.  carvedilol 6.25 mg oral tablet: 1 tab(s) orally 2 times a day  chlordiazepoxide-clidinium 5 mg-2.5 mg oral capsule: 1 cap(s) orally , As Needed  CoQ10: 1 cap(s) orally once a day. Last dose 10/6/21.  diclofenac 1% topical gel: Last dose 2-3 weeks ago.  DULoxetine 30 mg oral delayed release capsule: 2 cap(s) orally once a day (at bedtime)  Eliquis 2.5 mg oral tablet: 1 tab(s) orally 2 times a day  FiberCon 625 mg oral tablet: 1 tab(s) orally once a day AM  Fish Oil 1200 mg oral capsule: 1 cap(s) orally once a day. Last dose 10/5/21.  fluticasone 50 mcg/inh nasal spray: 1 spray(s) nasal once a day (at bedtime)  gabapentin 300 mg oral capsule: 1 cap(s) orally once a day AM  hyoscyamine 0.125 mg oral tablet: As Needed for Spasms  levocetirizine 5 mg oral tablet: 1 tab(s) orally once a day (in the evening)  metFORMIN 500 mg oral tablet, extended release: 2 tab(s) orally once a day AM.  MiraLax oral powder for reconstitution: As Needed for constipation.  montelukast 10 mg oral tablet: 1 tab(s) orally once a day (at bedtime)  Motrin 600 mg oral tablet: 1 tab(s) orally every 6 hours, As Needed  nitroglycerin 0.4 mg sublingual tablet: 1 tab(s) sublingual every 5 minutes As needed for chest pain.  pantoprazole 40 mg oral delayed release tablet: 1 tab(s) orally once a day AM.  predniSONE: 3 milligram(s) orally once a day AM.  Ultram 50 mg oral tablet: 0.5 tab(s) orally 3 times a day MDD:2 tabs  Vitamin B-12 1000 mcg oral tablet: 1 tab(s) orally once a day AM

## 2021-10-08 NOTE — BRIEF OPERATIVE NOTE - OPERATION/FINDINGS
EUA: mobile anteverted uterus approximately 12wk size with anterior mass appreciated  Intraop: peritoneal adhesions including sigmoid colon, pelvic sidewall and uterus.  lysis of adhesions.  uterus w/ 8cm anterior mass.  grossly normal bilateral fallopian tubes and right ovary.  left ovary with <1cm cyst containing gelatinous fluid.  bilateral ureters identified retroperitoneally.  bowel w/o any gross disease.  smooth liver edge.  vistaseal over operative site.  seprafilm in pelvis and anterior to omentum.  Frozen: left ovary=benign; uterus=atypical leiomyoma concerning for leiomyosarcoma EUA: mobile anteverted uterus approximately 12wk size with anterior mass appreciated  Intraop: peritoneal adhesions including sigmoid colon, pelvic sidewall and uterus.  lysis of adhesions.  uterus w/ 8cm anterior mass.  grossly normal bilateral fallopian tubes and right ovary.  left ovary with <1cm cyst containing gelatinous fluid.  bilateral ureters identified retroperitoneally.  bowel w/o any gross disease.  smooth liver edge.  methylene blue backfilled into bladder, no extravasation noted.  vistaseal over operative site.  seprafilm in pelvis and anterior to omentum.  Frozen: left ovary=benign; uterus=atypical leiomyoma concerning for leiomyosarcoma EUA: mobile anteverted uterus approximately 12wk size with anterior mass appreciated  Intraop: peritoneal adhesions including sigmoid colon, pelvic sidewall and uterus.  lysis of adhesions.  uterus w/ 8cm anterior fundal fibroid-like mass.  grossly normal bilateral fallopian tubes and right ovary.  left ovary with <1cm cyst containing gelatinous fluid.  bilateral ureters identified retroperitoneally.  no metastatic disease.   Frozen: left ovary=benign; uterus=atypical leiomyoma concerning for possible leiomyosarcoma

## 2021-10-09 DIAGNOSIS — Z98.890 OTHER SPECIFIED POSTPROCEDURAL STATES: ICD-10-CM

## 2021-10-09 LAB
ANION GAP SERPL CALC-SCNC: 14 MMOL/L — SIGNIFICANT CHANGE UP (ref 7–14)
BASOPHILS # BLD AUTO: 0.02 K/UL — SIGNIFICANT CHANGE UP (ref 0–0.2)
BASOPHILS NFR BLD AUTO: 0.3 % — SIGNIFICANT CHANGE UP (ref 0–2)
BUN SERPL-MCNC: 15 MG/DL — SIGNIFICANT CHANGE UP (ref 7–23)
CALCIUM SERPL-MCNC: 8.1 MG/DL — LOW (ref 8.4–10.5)
CHLORIDE SERPL-SCNC: 103 MMOL/L — SIGNIFICANT CHANGE UP (ref 98–107)
CO2 SERPL-SCNC: 16 MMOL/L — LOW (ref 22–31)
CREAT SERPL-MCNC: 0.72 MG/DL — SIGNIFICANT CHANGE UP (ref 0.5–1.3)
EOSINOPHIL # BLD AUTO: 0.03 K/UL — SIGNIFICANT CHANGE UP (ref 0–0.5)
EOSINOPHIL NFR BLD AUTO: 0.4 % — SIGNIFICANT CHANGE UP (ref 0–6)
GLUCOSE BLDC GLUCOMTR-MCNC: 121 MG/DL — HIGH (ref 70–99)
GLUCOSE BLDC GLUCOMTR-MCNC: 139 MG/DL — HIGH (ref 70–99)
GLUCOSE BLDC GLUCOMTR-MCNC: 145 MG/DL — HIGH (ref 70–99)
GLUCOSE BLDC GLUCOMTR-MCNC: 154 MG/DL — HIGH (ref 70–99)
GLUCOSE SERPL-MCNC: 128 MG/DL — HIGH (ref 70–99)
HCT VFR BLD CALC: 38.2 % — SIGNIFICANT CHANGE UP (ref 34.5–45)
HGB BLD-MCNC: 12.3 G/DL — SIGNIFICANT CHANGE UP (ref 11.5–15.5)
IANC: 5.02 K/UL — SIGNIFICANT CHANGE UP (ref 1.5–8.5)
IMM GRANULOCYTES NFR BLD AUTO: 0.3 % — SIGNIFICANT CHANGE UP (ref 0–1.5)
LYMPHOCYTES # BLD AUTO: 1.22 K/UL — SIGNIFICANT CHANGE UP (ref 1–3.3)
LYMPHOCYTES # BLD AUTO: 17 % — SIGNIFICANT CHANGE UP (ref 13–44)
MAGNESIUM SERPL-MCNC: 1.7 MG/DL — SIGNIFICANT CHANGE UP (ref 1.6–2.6)
MCHC RBC-ENTMCNC: 30.8 PG — SIGNIFICANT CHANGE UP (ref 27–34)
MCHC RBC-ENTMCNC: 32.2 GM/DL — SIGNIFICANT CHANGE UP (ref 32–36)
MCV RBC AUTO: 95.5 FL — SIGNIFICANT CHANGE UP (ref 80–100)
MONOCYTES # BLD AUTO: 0.87 K/UL — SIGNIFICANT CHANGE UP (ref 0–0.9)
MONOCYTES NFR BLD AUTO: 12.1 % — SIGNIFICANT CHANGE UP (ref 2–14)
NEUTROPHILS # BLD AUTO: 5.02 K/UL — SIGNIFICANT CHANGE UP (ref 1.8–7.4)
NEUTROPHILS NFR BLD AUTO: 69.9 % — SIGNIFICANT CHANGE UP (ref 43–77)
NRBC # BLD: 0 /100 WBCS — SIGNIFICANT CHANGE UP
NRBC # FLD: 0 K/UL — SIGNIFICANT CHANGE UP
PHOSPHATE SERPL-MCNC: 3.5 MG/DL — SIGNIFICANT CHANGE UP (ref 2.5–4.5)
PLATELET # BLD AUTO: 190 K/UL — SIGNIFICANT CHANGE UP (ref 150–400)
POTASSIUM SERPL-MCNC: 5.3 MMOL/L — SIGNIFICANT CHANGE UP (ref 3.5–5.3)
POTASSIUM SERPL-SCNC: 5.3 MMOL/L — SIGNIFICANT CHANGE UP (ref 3.5–5.3)
RBC # BLD: 4 M/UL — SIGNIFICANT CHANGE UP (ref 3.8–5.2)
RBC # FLD: 13.6 % — SIGNIFICANT CHANGE UP (ref 10.3–14.5)
SODIUM SERPL-SCNC: 133 MMOL/L — LOW (ref 135–145)
WBC # BLD: 7.18 K/UL — SIGNIFICANT CHANGE UP (ref 3.8–10.5)
WBC # FLD AUTO: 7.18 K/UL — SIGNIFICANT CHANGE UP (ref 3.8–10.5)

## 2021-10-09 RX ORDER — ONDANSETRON 8 MG/1
4 TABLET, FILM COATED ORAL EVERY 6 HOURS
Refills: 0 | Status: DISCONTINUED | OUTPATIENT
Start: 2021-10-09 | End: 2021-10-11

## 2021-10-09 RX ORDER — ASPIRIN/CALCIUM CARB/MAGNESIUM 324 MG
81 TABLET ORAL DAILY
Refills: 0 | Status: DISCONTINUED | OUTPATIENT
Start: 2021-10-10 | End: 2021-10-11

## 2021-10-09 RX ORDER — ENOXAPARIN SODIUM 100 MG/ML
40 INJECTION SUBCUTANEOUS DAILY
Refills: 0 | Status: DISCONTINUED | OUTPATIENT
Start: 2021-10-09 | End: 2021-10-11

## 2021-10-09 RX ORDER — APIXABAN 2.5 MG/1
1 TABLET, FILM COATED ORAL
Qty: 60 | Refills: 0
Start: 2021-10-09 | End: 2021-11-07

## 2021-10-09 RX ORDER — OXYCODONE HYDROCHLORIDE 5 MG/1
5 TABLET ORAL EVERY 4 HOURS
Refills: 0 | Status: DISCONTINUED | OUTPATIENT
Start: 2021-10-09 | End: 2021-10-10

## 2021-10-09 RX ORDER — IBUPROFEN 200 MG
600 TABLET ORAL EVERY 6 HOURS
Refills: 0 | Status: DISCONTINUED | OUTPATIENT
Start: 2021-10-09 | End: 2021-10-11

## 2021-10-09 RX ORDER — PANTOPRAZOLE SODIUM 20 MG/1
40 TABLET, DELAYED RELEASE ORAL DAILY
Refills: 0 | Status: DISCONTINUED | OUTPATIENT
Start: 2021-10-09 | End: 2021-10-11

## 2021-10-09 RX ADMIN — Medication 975 MILLIGRAM(S): at 11:37

## 2021-10-09 RX ADMIN — DULOXETINE HYDROCHLORIDE 60 MILLIGRAM(S): 30 CAPSULE, DELAYED RELEASE ORAL at 11:37

## 2021-10-09 RX ADMIN — PANTOPRAZOLE SODIUM 40 MILLIGRAM(S): 20 TABLET, DELAYED RELEASE ORAL at 12:31

## 2021-10-09 RX ADMIN — Medication 975 MILLIGRAM(S): at 05:53

## 2021-10-09 RX ADMIN — Medication 975 MILLIGRAM(S): at 18:35

## 2021-10-09 RX ADMIN — HEPARIN SODIUM 5000 UNIT(S): 5000 INJECTION INTRAVENOUS; SUBCUTANEOUS at 08:01

## 2021-10-09 RX ADMIN — Medication 3 MILLIGRAM(S): at 06:12

## 2021-10-09 RX ADMIN — SODIUM CHLORIDE 125 MILLILITER(S): 9 INJECTION, SOLUTION INTRAVENOUS at 08:02

## 2021-10-09 RX ADMIN — ENOXAPARIN SODIUM 40 MILLIGRAM(S): 100 INJECTION SUBCUTANEOUS at 16:20

## 2021-10-09 RX ADMIN — SODIUM CHLORIDE 75 MILLILITER(S): 9 INJECTION, SOLUTION INTRAVENOUS at 22:01

## 2021-10-09 RX ADMIN — MONTELUKAST 10 MILLIGRAM(S): 4 TABLET, CHEWABLE ORAL at 21:59

## 2021-10-09 RX ADMIN — SIMETHICONE 80 MILLIGRAM(S): 80 TABLET, CHEWABLE ORAL at 11:38

## 2021-10-09 RX ADMIN — GABAPENTIN 300 MILLIGRAM(S): 400 CAPSULE ORAL at 11:37

## 2021-10-09 RX ADMIN — Medication 600 MILLIGRAM(S): at 16:20

## 2021-10-09 RX ADMIN — Medication 15 MILLIGRAM(S): at 11:37

## 2021-10-09 RX ADMIN — SODIUM CHLORIDE 75 MILLILITER(S): 9 INJECTION, SOLUTION INTRAVENOUS at 16:23

## 2021-10-09 RX ADMIN — SODIUM CHLORIDE 75 MILLILITER(S): 9 INJECTION, SOLUTION INTRAVENOUS at 18:35

## 2021-10-09 RX ADMIN — Medication 15 MILLIGRAM(S): at 05:55

## 2021-10-09 RX ADMIN — Medication 1: at 12:32

## 2021-10-09 NOTE — PROGRESS NOTE ADULT - SUBJECTIVE AND OBJECTIVE BOX
Anesthesia Pain Management Service    SUBJECTIVE: Patient s/p spinal morphine with pain managable and no problems.  Pain Scale Score: 2/10 Refer to charted pain scores    THERAPY:    s/p spinal PF morphine yesterday.      MEDICATIONS  (STANDING):  acetaminophen   Tablet .. 975 milliGRAM(s) Oral every 6 hours  acetaminophen  IVPB .. 1000 milliGRAM(s) IV Intermittent once  dextrose 40% Gel 15 Gram(s) Oral once  dextrose 5%. 1000 milliLiter(s) (50 mL/Hr) IV Continuous <Continuous>  dextrose 5%. 1000 milliLiter(s) (100 mL/Hr) IV Continuous <Continuous>  dextrose 50% Injectable 25 Gram(s) IV Push once  dextrose 50% Injectable 12.5 Gram(s) IV Push once  dextrose 50% Injectable 25 Gram(s) IV Push once  DULoxetine 60 milliGRAM(s) Oral daily  enoxaparin Injectable 40 milliGRAM(s) SubCutaneous daily  gabapentin 300 milliGRAM(s) Oral daily  glucagon  Injectable 1 milliGRAM(s) IntraMuscular once  influenza  Vaccine (HIGH DOSE) 0.7 milliLiter(s) IntraMuscular once  insulin lispro (ADMELOG) corrective regimen sliding scale   SubCutaneous three times a day before meals  insulin lispro (ADMELOG) corrective regimen sliding scale   SubCutaneous at bedtime  lactated ringers. 1000 milliLiter(s) (125 mL/Hr) IV Continuous <Continuous>  montelukast 10 milliGRAM(s) Oral at bedtime  morphine PF Spinal 0.2 milliGRAM(s) IntraThecal. once  pantoprazole  Injectable 40 milliGRAM(s) IV Push daily  predniSONE   Tablet 3 milliGRAM(s) Oral daily  simethicone 80 milliGRAM(s) Chew daily    MEDICATIONS  (PRN):  dexAMETHasone  Injectable 4 milliGRAM(s) IV Push every 6 hours PRN Nausea  metoprolol tartrate Injectable 5 milliGRAM(s) IV Push every 6 hours PRN Hypertension  naloxone Injectable 0.1 milliGRAM(s) IV Push every 3 minutes PRN For ANY of the following changes in patient status:  A. Breaths Per Minute LESS THAN 10, B. Oxygen saturation LESS THAN 90%, C. Sedation score of 6 for Stop After: 4 Times  ondansetron    Tablet 4 milliGRAM(s) Oral every 6 hours PRN Nausea and/or Vomiting      OBJECTIVE: Patient sitting up in chair.    Sedation Score:	[ x] Alert	[ ] Drowsy	[ ] Arousable	[ ] Asleep	[ ] Unresponsive    Side Effects:	[ x] None	[ ] Nausea	[ ] Vomiting	[ ] Pruritus  		  [ ] Weakness		[ ] Numbness	[ ] Other:    Vital Signs Last 24 Hrs  T(C): 37.1 (09 Oct 2021 09:34), Max: 37.1 (09 Oct 2021 09:34)  T(F): 98.8 (09 Oct 2021 09:34), Max: 98.8 (09 Oct 2021 09:34)  HR: 70 (09 Oct 2021 09:34) (64 - 75)  BP: 129/59 (09 Oct 2021 09:34) (96/51 - 135/59)  BP(mean): 67 (08 Oct 2021 16:45) (67 - 85)  RR: 16 (09 Oct 2021 09:34) (12 - 18)  SpO2: 100% (09 Oct 2021 09:34) (94% - 100%)    ASSESSMENT/ PLAN  [x ] Patient transitioned to prn analgesics  [x] Pain management per primary service, pain service to sign off   [x]Documentation and Verification of current medications     Comments: PRN Oral/IV opioids and/or non-opioid Adjuvant analgesics to be used at this point.    Progress Note written now but Patient was seen earlier.

## 2021-10-09 NOTE — PROGRESS NOTE ADULT - PROBLEM SELECTOR PLAN 1
Neuro: Tylenol/Toradol. On home Gabapentin, Duloxetine  CV: Hemodynamically stable, Lopressor PRN, f/u AM labs  Pulm: Saturating well on RA. Nasal cannula overnight. Increase incentive spirometry.  GI: CLD, advance diet as tolerated  : Paul in place. 0.46 cc/kg hr overnight.  Heme: Continue HSQ/Venodynes for DVT ppx. Transition to Lovenox. Increase OOB.    ID: Afebrile  Endo: ISS, on home dose of Prednisone  Dispo: continue postoperative care, advance diet as tolerated.    James Hightower PGY2 Neuro: Tylenol/Toradol. On home Gabapentin, Duloxetine  CV: Hemodynamically stable, Lopressor PRN, f/u AM labs  Pulm: Saturating well on RA. Nasal cannula overnight. Increase incentive spirometry.  GI: CLD, advance diet as tolerated  : Paul in place. 0.46 cc/kg hr overnight.  FEN: f/u AM labs. Replete electrolytes PRN  Heme: Continue HSQ/Venodynes for DVT ppx. Transition to Lovenox. Increase OOB.    ID: Afebrile  Endo: ISS, on home dose of Prednisone  Dispo: continue postoperative care, advance diet as tolerated.    James Hightower PGY2

## 2021-10-09 NOTE — PROGRESS NOTE ADULT - ASSESSMENT
76y POD#1 s/p exploratory laparotomy, total abdominal hysterectomy,  bilateral salpingo-oophorectomy, b/l blocks (frozen = benign left ovary, uterus w/ atypical leiomyoma concerning for possible leiomyosarcoma). 76y with PMHx HTN, polymyalgia rheumatica POD#1 s/p exploratory laparotomy, total abdominal hysterectomy,  bilateral salpingo-oophorectomy, b/l blocks (frozen = benign left ovary, uterus w/ atypical leiomyoma concerning for possible leiomyosarcoma).

## 2021-10-09 NOTE — PROGRESS NOTE ADULT - SUBJECTIVE AND OBJECTIVE BOX
R2 Gyn ONC Progress Note POD#1  HD#2    Subjective:   Pt seen and examined at bedside. No events overnight. Pain well controlled. Patient ambulating. Passing flatus. Tolerating regular diet. Pt denies fever, chills, chest pain, SOB, nausea, vomiting, lightheadedness, dizziness.      Objective:  T(F): 98.1 (10-09-21 @ 01:26), Max: 98.6 (10-08-21 @ 13:20)  HR: 70 (10-09-21 @ 01:26) (64 - 75)  BP: 96/51 (10-09-21 @ 01:26) (96/51 - 135/59)  RR: 18 (10-09-21 @ 01:26) (12 - 18)  SpO2: 100% (10-09-21 @ 01:26) (94% - 100%)  Wt(kg): --  I&O's Summary    08 Oct 2021 07:01  -  09 Oct 2021 06:22  --------------------------------------------------------  IN: 1325 mL / OUT: 700 mL / NET: 625 mL      CAPILLARY BLOOD GLUCOSE      POCT Blood Glucose.: 126 mg/dL (08 Oct 2021 23:29)  POCT Blood Glucose.: 171 mg/dL (08 Oct 2021 16:09)  POCT Blood Glucose.: 164 mg/dL (08 Oct 2021 14:18)      MEDICATIONS  (STANDING):  acetaminophen   Tablet .. 975 milliGRAM(s) Oral every 6 hours  acetaminophen  IVPB .. 1000 milliGRAM(s) IV Intermittent once  dextrose 40% Gel 15 Gram(s) Oral once  dextrose 5%. 1000 milliLiter(s) (50 mL/Hr) IV Continuous <Continuous>  dextrose 5%. 1000 milliLiter(s) (100 mL/Hr) IV Continuous <Continuous>  dextrose 50% Injectable 25 Gram(s) IV Push once  dextrose 50% Injectable 12.5 Gram(s) IV Push once  dextrose 50% Injectable 25 Gram(s) IV Push once  DULoxetine 60 milliGRAM(s) Oral daily  gabapentin 300 milliGRAM(s) Oral daily  glucagon  Injectable 1 milliGRAM(s) IntraMuscular once  heparin   Injectable 5000 Unit(s) SubCutaneous every 8 hours  influenza  Vaccine (HIGH DOSE) 0.7 milliLiter(s) IntraMuscular once  insulin lispro (ADMELOG) corrective regimen sliding scale   SubCutaneous three times a day before meals  insulin lispro (ADMELOG) corrective regimen sliding scale   SubCutaneous at bedtime  ketorolac   Injectable 15 milliGRAM(s) IV Push every 6 hours  lactated ringers. 1000 milliLiter(s) (125 mL/Hr) IV Continuous <Continuous>  montelukast 10 milliGRAM(s) Oral at bedtime  morphine PF Spinal 0.2 milliGRAM(s) IntraThecal. once  predniSONE   Tablet 3 milliGRAM(s) Oral daily  simethicone 80 milliGRAM(s) Chew daily    MEDICATIONS  (PRN):  dexAMETHasone  Injectable 4 milliGRAM(s) IV Push every 6 hours PRN Nausea  metoprolol tartrate Injectable 5 milliGRAM(s) IV Push every 6 hours PRN Hypertension  naloxone Injectable 0.1 milliGRAM(s) IV Push every 3 minutes PRN For ANY of the following changes in patient status:  A. Breaths Per Minute LESS THAN 10, B. Oxygen saturation LESS THAN 90%, C. Sedation score of 6 for Stop After: 4 Times  ondansetron    Tablet 4 milliGRAM(s) Oral every 6 hours PRN Nausea and/or Vomiting      Physical Exam:  Constitutional: NAD, A+O x3  CV: RR S1S2 no m/r/g  Lungs: CTA b/l, good air flow b/l  Abdomen: soft, softly-distended, appropriately-tender. no guarding, no rebound, normal bowel sounds  Incision: clean, dry, intact vertical midline incision  Extremities: no lower extremity edema or calf tenderness bilaterally; venodynes in place    LABS:  10-08    139    |  105    |  18     ----------------------------<  200<H>  4.8     |  23     |  0.73     Ca    8.6        08 Oct 2021 16:11         R2 Gyn ONC Progress Note POD#1  HD#2    Subjective:   Pt seen and examined at bedside. No events overnight. Pain well controlled. Patient ambulating. Not passing flatus. Tolerating clear liquid diet. Pt denies fever, chills, chest pain, SOB, nausea, vomiting, lightheadedness, dizziness.      Objective:  T(F): 98.1 (10-09-21 @ 01:26), Max: 98.6 (10-08-21 @ 13:20)  HR: 70 (10-09-21 @ 01:26) (64 - 75)  BP: 96/51 (10-09-21 @ 01:26) (96/51 - 135/59)  RR: 18 (10-09-21 @ 01:26) (12 - 18)  SpO2: 100% (10-09-21 @ 01:26) (94% - 100%)  Wt(kg): --  I&O's Summary    08 Oct 2021 07:01  -  09 Oct 2021 06:22  --------------------------------------------------------  IN: 1325 mL / OUT: 700 mL / NET: 625 mL      CAPILLARY BLOOD GLUCOSE      POCT Blood Glucose.: 126 mg/dL (08 Oct 2021 23:29)  POCT Blood Glucose.: 171 mg/dL (08 Oct 2021 16:09)  POCT Blood Glucose.: 164 mg/dL (08 Oct 2021 14:18)      MEDICATIONS  (STANDING):  acetaminophen   Tablet .. 975 milliGRAM(s) Oral every 6 hours  acetaminophen  IVPB .. 1000 milliGRAM(s) IV Intermittent once  dextrose 40% Gel 15 Gram(s) Oral once  dextrose 5%. 1000 milliLiter(s) (50 mL/Hr) IV Continuous <Continuous>  dextrose 5%. 1000 milliLiter(s) (100 mL/Hr) IV Continuous <Continuous>  dextrose 50% Injectable 25 Gram(s) IV Push once  dextrose 50% Injectable 12.5 Gram(s) IV Push once  dextrose 50% Injectable 25 Gram(s) IV Push once  DULoxetine 60 milliGRAM(s) Oral daily  gabapentin 300 milliGRAM(s) Oral daily  glucagon  Injectable 1 milliGRAM(s) IntraMuscular once  heparin   Injectable 5000 Unit(s) SubCutaneous every 8 hours  influenza  Vaccine (HIGH DOSE) 0.7 milliLiter(s) IntraMuscular once  insulin lispro (ADMELOG) corrective regimen sliding scale   SubCutaneous three times a day before meals  insulin lispro (ADMELOG) corrective regimen sliding scale   SubCutaneous at bedtime  ketorolac   Injectable 15 milliGRAM(s) IV Push every 6 hours  lactated ringers. 1000 milliLiter(s) (125 mL/Hr) IV Continuous <Continuous>  montelukast 10 milliGRAM(s) Oral at bedtime  morphine PF Spinal 0.2 milliGRAM(s) IntraThecal. once  predniSONE   Tablet 3 milliGRAM(s) Oral daily  simethicone 80 milliGRAM(s) Chew daily    MEDICATIONS  (PRN):  dexAMETHasone  Injectable 4 milliGRAM(s) IV Push every 6 hours PRN Nausea  metoprolol tartrate Injectable 5 milliGRAM(s) IV Push every 6 hours PRN Hypertension  naloxone Injectable 0.1 milliGRAM(s) IV Push every 3 minutes PRN For ANY of the following changes in patient status:  A. Breaths Per Minute LESS THAN 10, B. Oxygen saturation LESS THAN 90%, C. Sedation score of 6 for Stop After: 4 Times  ondansetron    Tablet 4 milliGRAM(s) Oral every 6 hours PRN Nausea and/or Vomiting      Physical Exam:  Constitutional: NAD, A+O x3  CV: RR S1S2 no m/r/g  Lungs: CTA b/l, good air flow b/l  Abdomen: soft, softly-distended, appropriately-tender. no guarding, no rebound, normal bowel sounds  Incision: clean, dry, intact vertical midline incision  Extremities: no lower extremity edema or calf tenderness bilaterally; venodynes in place    LABS:  10-08    139    |  105    |  18     ----------------------------<  200<H>  4.8     |  23     |  0.73     Ca    8.6        08 Oct 2021 16:11

## 2021-10-09 NOTE — PROGRESS NOTE ADULT - SUBJECTIVE AND OBJECTIVE BOX
ANESTHESIA POSTOP CHECK    76y Female POSTOP DAY 1 S/P     Vital Signs Last 24 Hrs  T(C): 36.8 (09 Oct 2021 14:04), Max: 37.1 (09 Oct 2021 09:34)  T(F): 98.3 (09 Oct 2021 14:04), Max: 98.8 (09 Oct 2021 09:34)  HR: 68 (09 Oct 2021 14:04) (64 - 74)  BP: 117/52 (09 Oct 2021 14:04) (96/51 - 129/59)  BP(mean): 67 (08 Oct 2021 16:45) (67 - 67)  RR: 17 (09 Oct 2021 14:04) (16 - 18)  SpO2: 98% (09 Oct 2021 14:04) (96% - 100%)  I&O's Summary    08 Oct 2021 07:01  -  09 Oct 2021 07:00  --------------------------------------------------------  IN: 2225 mL / OUT: 1000 mL / NET: 1225 mL    09 Oct 2021 07:01  -  09 Oct 2021 16:43  --------------------------------------------------------  IN: 1615 mL / OUT: 1050 mL / NET: 565 mL        [X ] NO APPARENT ANESTHESIA COMPLICATIONS      Comments:

## 2021-10-10 LAB
ANION GAP SERPL CALC-SCNC: 10 MMOL/L — SIGNIFICANT CHANGE UP (ref 7–14)
BASOPHILS # BLD AUTO: 0.03 K/UL — SIGNIFICANT CHANGE UP (ref 0–0.2)
BASOPHILS NFR BLD AUTO: 0.5 % — SIGNIFICANT CHANGE UP (ref 0–2)
BUN SERPL-MCNC: 14 MG/DL — SIGNIFICANT CHANGE UP (ref 7–23)
CALCIUM SERPL-MCNC: 8.4 MG/DL — SIGNIFICANT CHANGE UP (ref 8.4–10.5)
CHLORIDE SERPL-SCNC: 106 MMOL/L — SIGNIFICANT CHANGE UP (ref 98–107)
CO2 SERPL-SCNC: 23 MMOL/L — SIGNIFICANT CHANGE UP (ref 22–31)
CREAT SERPL-MCNC: 0.76 MG/DL — SIGNIFICANT CHANGE UP (ref 0.5–1.3)
EOSINOPHIL # BLD AUTO: 0.12 K/UL — SIGNIFICANT CHANGE UP (ref 0–0.5)
EOSINOPHIL NFR BLD AUTO: 2 % — SIGNIFICANT CHANGE UP (ref 0–6)
GLUCOSE BLDC GLUCOMTR-MCNC: 113 MG/DL — HIGH (ref 70–99)
GLUCOSE BLDC GLUCOMTR-MCNC: 119 MG/DL — HIGH (ref 70–99)
GLUCOSE BLDC GLUCOMTR-MCNC: 122 MG/DL — HIGH (ref 70–99)
GLUCOSE BLDC GLUCOMTR-MCNC: 143 MG/DL — HIGH (ref 70–99)
GLUCOSE SERPL-MCNC: 184 MG/DL — HIGH (ref 70–99)
HCT VFR BLD CALC: 33.3 % — LOW (ref 34.5–45)
HGB BLD-MCNC: 11 G/DL — LOW (ref 11.5–15.5)
IANC: 3.74 K/UL — SIGNIFICANT CHANGE UP (ref 1.5–8.5)
IMM GRANULOCYTES NFR BLD AUTO: 0.3 % — SIGNIFICANT CHANGE UP (ref 0–1.5)
LYMPHOCYTES # BLD AUTO: 1.4 K/UL — SIGNIFICANT CHANGE UP (ref 1–3.3)
LYMPHOCYTES # BLD AUTO: 23.5 % — SIGNIFICANT CHANGE UP (ref 13–44)
MAGNESIUM SERPL-MCNC: 1.9 MG/DL — SIGNIFICANT CHANGE UP (ref 1.6–2.6)
MCHC RBC-ENTMCNC: 31 PG — SIGNIFICANT CHANGE UP (ref 27–34)
MCHC RBC-ENTMCNC: 33 GM/DL — SIGNIFICANT CHANGE UP (ref 32–36)
MCV RBC AUTO: 93.8 FL — SIGNIFICANT CHANGE UP (ref 80–100)
MONOCYTES # BLD AUTO: 0.64 K/UL — SIGNIFICANT CHANGE UP (ref 0–0.9)
MONOCYTES NFR BLD AUTO: 10.8 % — SIGNIFICANT CHANGE UP (ref 2–14)
NEUTROPHILS # BLD AUTO: 3.74 K/UL — SIGNIFICANT CHANGE UP (ref 1.8–7.4)
NEUTROPHILS NFR BLD AUTO: 62.9 % — SIGNIFICANT CHANGE UP (ref 43–77)
NRBC # BLD: 0 /100 WBCS — SIGNIFICANT CHANGE UP
NRBC # FLD: 0 K/UL — SIGNIFICANT CHANGE UP
PHOSPHATE SERPL-MCNC: 1.5 MG/DL — LOW (ref 2.5–4.5)
PLATELET # BLD AUTO: 199 K/UL — SIGNIFICANT CHANGE UP (ref 150–400)
POTASSIUM SERPL-MCNC: 4.5 MMOL/L — SIGNIFICANT CHANGE UP (ref 3.5–5.3)
POTASSIUM SERPL-SCNC: 4.5 MMOL/L — SIGNIFICANT CHANGE UP (ref 3.5–5.3)
RBC # BLD: 3.55 M/UL — LOW (ref 3.8–5.2)
RBC # FLD: 13.6 % — SIGNIFICANT CHANGE UP (ref 10.3–14.5)
SODIUM SERPL-SCNC: 139 MMOL/L — SIGNIFICANT CHANGE UP (ref 135–145)
WBC # BLD: 5.95 K/UL — SIGNIFICANT CHANGE UP (ref 3.8–10.5)
WBC # FLD AUTO: 5.95 K/UL — SIGNIFICANT CHANGE UP (ref 3.8–10.5)

## 2021-10-10 RX ORDER — SODIUM,POTASSIUM PHOSPHATES 278-250MG
1 POWDER IN PACKET (EA) ORAL ONCE
Refills: 0 | Status: COMPLETED | OUTPATIENT
Start: 2021-10-10 | End: 2021-10-10

## 2021-10-10 RX ORDER — TRAMADOL HYDROCHLORIDE 50 MG/1
25 TABLET ORAL THREE TIMES A DAY
Refills: 0 | Status: DISCONTINUED | OUTPATIENT
Start: 2021-10-10 | End: 2021-10-11

## 2021-10-10 RX ORDER — LANOLIN ALCOHOL/MO/W.PET/CERES
3 CREAM (GRAM) TOPICAL AT BEDTIME
Refills: 0 | Status: DISCONTINUED | OUTPATIENT
Start: 2021-10-10 | End: 2021-10-11

## 2021-10-10 RX ADMIN — GABAPENTIN 300 MILLIGRAM(S): 400 CAPSULE ORAL at 11:54

## 2021-10-10 RX ADMIN — Medication 600 MILLIGRAM(S): at 00:40

## 2021-10-10 RX ADMIN — Medication 600 MILLIGRAM(S): at 05:44

## 2021-10-10 RX ADMIN — Medication 975 MILLIGRAM(S): at 00:40

## 2021-10-10 RX ADMIN — Medication 1 PACKET(S): at 19:05

## 2021-10-10 RX ADMIN — ENOXAPARIN SODIUM 40 MILLIGRAM(S): 100 INJECTION SUBCUTANEOUS at 17:55

## 2021-10-10 RX ADMIN — Medication 975 MILLIGRAM(S): at 17:57

## 2021-10-10 RX ADMIN — Medication 600 MILLIGRAM(S): at 11:53

## 2021-10-10 RX ADMIN — Medication 81 MILLIGRAM(S): at 11:52

## 2021-10-10 RX ADMIN — Medication 3 MILLIGRAM(S): at 05:45

## 2021-10-10 RX ADMIN — Medication 600 MILLIGRAM(S): at 17:57

## 2021-10-10 RX ADMIN — Medication 975 MILLIGRAM(S): at 11:54

## 2021-10-10 RX ADMIN — SIMETHICONE 80 MILLIGRAM(S): 80 TABLET, CHEWABLE ORAL at 11:54

## 2021-10-10 RX ADMIN — Medication 975 MILLIGRAM(S): at 05:44

## 2021-10-10 RX ADMIN — TRAMADOL HYDROCHLORIDE 25 MILLIGRAM(S): 50 TABLET ORAL at 20:30

## 2021-10-10 RX ADMIN — MONTELUKAST 10 MILLIGRAM(S): 4 TABLET, CHEWABLE ORAL at 22:31

## 2021-10-10 RX ADMIN — PANTOPRAZOLE SODIUM 40 MILLIGRAM(S): 20 TABLET, DELAYED RELEASE ORAL at 11:56

## 2021-10-10 RX ADMIN — TRAMADOL HYDROCHLORIDE 25 MILLIGRAM(S): 50 TABLET ORAL at 22:00

## 2021-10-10 RX ADMIN — DULOXETINE HYDROCHLORIDE 60 MILLIGRAM(S): 30 CAPSULE, DELAYED RELEASE ORAL at 11:54

## 2021-10-10 NOTE — PROVIDER CONTACT NOTE (OTHER) - ASSESSMENT
Pt resting in bed. All other vitals stable. Denies chest pain, SOB, palpitations. Pt very anxious following up with oncologist, says her anxiety is raising her BP. Pt refuses PRN IV Lopressor stating her PCP told her lopressor affects her liver enzymes, does not want to take any meds for BP.
Pt is A&Ox4, resting comfortably in bed. Vitals:  temp 98.1 HR 70 BP 96/51 RR 18 SpO2 100% on 2L NC

## 2021-10-10 NOTE — PROVIDER CONTACT NOTE (OTHER) - ACTION/TREATMENT ORDERED:
MD aware. No new orders received at this time. Continue to monitor.
MD made aware. no new orders. will continue to monitor.

## 2021-10-10 NOTE — PROGRESS NOTE ADULT - SUBJECTIVE AND OBJECTIVE BOX
R2 Gyn ONC Progress Note POD#2  HD#3    Subjective:   Pt seen and examined at bedside. No events overnight. Pain well controlled. Patient ambulating. Passing flatus. Paul still in place. Tolerating regular diet. Pt denies fever, chills, chest pain, SOB, nausea, vomiting, lightheadedness, dizziness.      Objective:  T(F): 97.8 (10-10-21 @ 01:21), Max: 98.8 (10-09-21 @ 09:34)  HR: 68 (10-10-21 @ 01:21) (66 - 80)  BP: 143/64 (10-10-21 @ 01:21) (117/52 - 143/64)  RR: 18 (10-10-21 @ 01:21) (16 - 18)  SpO2: 96% (10-10-21 @ 01:21) (95% - 100%)  Wt(kg): --  I&O's Summary    08 Oct 2021 07:01  -  09 Oct 2021 07:00  --------------------------------------------------------  IN: 2225 mL / OUT: 1000 mL / NET: 1225 mL    09 Oct 2021 07:01  -  10 Oct 2021 06:00  --------------------------------------------------------  IN: 2380 mL / OUT: 2850 mL / NET: -470 mL      CAPILLARY BLOOD GLUCOSE      POCT Blood Glucose.: 139 mg/dL (09 Oct 2021 21:47)  POCT Blood Glucose.: 121 mg/dL (09 Oct 2021 16:58)  POCT Blood Glucose.: 154 mg/dL (09 Oct 2021 12:02)  POCT Blood Glucose.: 145 mg/dL (09 Oct 2021 08:38)      MEDICATIONS  (STANDING):  acetaminophen   Tablet .. 975 milliGRAM(s) Oral every 6 hours  aspirin  chewable 81 milliGRAM(s) Oral daily  dextrose 40% Gel 15 Gram(s) Oral once  dextrose 5%. 1000 milliLiter(s) (100 mL/Hr) IV Continuous <Continuous>  dextrose 5%. 1000 milliLiter(s) (50 mL/Hr) IV Continuous <Continuous>  dextrose 50% Injectable 25 Gram(s) IV Push once  dextrose 50% Injectable 12.5 Gram(s) IV Push once  dextrose 50% Injectable 25 Gram(s) IV Push once  DULoxetine 60 milliGRAM(s) Oral daily  enoxaparin Injectable 40 milliGRAM(s) SubCutaneous daily  gabapentin 300 milliGRAM(s) Oral daily  glucagon  Injectable 1 milliGRAM(s) IntraMuscular once  ibuprofen  Tablet. 600 milliGRAM(s) Oral every 6 hours  influenza  Vaccine (HIGH DOSE) 0.7 milliLiter(s) IntraMuscular once  insulin lispro (ADMELOG) corrective regimen sliding scale   SubCutaneous three times a day before meals  insulin lispro (ADMELOG) corrective regimen sliding scale   SubCutaneous at bedtime  lactated ringers. 1000 milliLiter(s) (75 mL/Hr) IV Continuous <Continuous>  montelukast 10 milliGRAM(s) Oral at bedtime  pantoprazole  Injectable 40 milliGRAM(s) IV Push daily  predniSONE   Tablet 3 milliGRAM(s) Oral daily  simethicone 80 milliGRAM(s) Chew daily    MEDICATIONS  (PRN):  metoprolol tartrate Injectable 5 milliGRAM(s) IV Push every 6 hours PRN Hypertension  naloxone Injectable 0.1 milliGRAM(s) IV Push every 3 minutes PRN For ANY of the following changes in patient status:  A. Breaths Per Minute LESS THAN 10, B. Oxygen saturation LESS THAN 90%, C. Sedation score of 6 for Stop After: 4 Times  ondansetron Injectable 4 milliGRAM(s) IV Push every 6 hours PRN Nausea and/or Vomiting  oxyCODONE    IR 5 milliGRAM(s) Oral every 4 hours PRN Moderate Pain (4 - 6)      Physical Exam:  Constitutional: NAD, A+O x3  CV: RR S1S2 no m/r/g  Lungs: CTA b/l, good air flow b/l  Abdomen: soft, softly-distended, appropriately-tender. no guarding, no rebound, normal bowel sounds  Incision: clean, dry, intact vertical midline incision  Extremities: no lower extremity edema or calf tenderness bilaterally; venodynes in place    LABS:  10-09    133<L>  |  103    |  15     ----------------------------<  128<H>  5.3     |  16<L>  |  0.72     Ca    8.1<L>      09 Oct 2021 06:12  Phos  3.5       10-09  Mg     1.70      10-09

## 2021-10-10 NOTE — PROGRESS NOTE ADULT - PROBLEM SELECTOR PLAN 1
Neuro: Tylenol/Toradol. On home Gabapentin, Duloxetine  CV: Hemodynamically stable, Lopressor PRN, f/u AM CBC/BMP/Mg/Phos  Pulm: Saturating well on RA. Nasal cannula overnight. Increase incentive spirometry.  GI: Regular diet  : Paul in place. 1.33cc/kg/hr overnight. f/u AM CBC/BMP/Mg/Phos, consider removal   FEN: f/u AM labs. Replete electrolytes PRN  Heme: Continue HSQ/Venodynes for DVT ppx. Transition to Lovenox. Increase OOB.    ID: Afebrile  Endo: ISS, on home dose of Prednisone  Dispo: continue postoperative care, f/u AM labs and possibly dc jessica Hightower PGY2 Neuro: Tylenol/Toradol. On home Gabapentin, Duloxetine  CV: Hemodynamically stable, Lopressor PRN, f/u AM CBC/BMP/Mg/Phos  Pulm: Saturating well on RA. Nasal cannula overnight. Increase incentive spirometry.  GI: Regular diet  : Paul in place. 1.33cc/kg/hr overnight. f/u AM CBC/BMP/Mg/Phos, consider removal   FEN: f/u AM labs. Replete electrolytes PRN  Heme: Continue HSQ/Venodynes for DVT ppx. Transition to Lovenox. Increase OOB.    ID: Afebrile  Endo: ISS, on home dose of Prednisone  Dispo: continue postoperative care, f/u AM labs and dc jessica Hightower PGY2

## 2021-10-10 NOTE — PROGRESS NOTE ADULT - ASSESSMENT
76y with PMHx HTN, polymyalgia rheumatica POD#2 s/p exploratory laparotomy, total abdominal hysterectomy,  bilateral salpingo-oophorectomy, b/l blocks (frozen = benign left ovary, uterus w/ atypical leiomyoma concerning for possible leiomyosarcoma).

## 2021-10-11 ENCOUNTER — TRANSCRIPTION ENCOUNTER (OUTPATIENT)
Age: 76
End: 2021-10-11

## 2021-10-11 ENCOUNTER — NON-APPOINTMENT (OUTPATIENT)
Age: 76
End: 2021-10-11

## 2021-10-11 VITALS
TEMPERATURE: 99 F | HEART RATE: 81 BPM | SYSTOLIC BLOOD PRESSURE: 143 MMHG | OXYGEN SATURATION: 98 % | DIASTOLIC BLOOD PRESSURE: 73 MMHG | RESPIRATION RATE: 18 BRPM

## 2021-10-11 LAB
COVID-19 SPIKE DOMAIN AB INTERP: POSITIVE
COVID-19 SPIKE DOMAIN ANTIBODY RESULT: 120 U/ML — HIGH
GLUCOSE BLDC GLUCOMTR-MCNC: 141 MG/DL — HIGH (ref 70–99)
SARS-COV-2 IGG+IGM SERPL QL IA: 120 U/ML — HIGH
SARS-COV-2 IGG+IGM SERPL QL IA: POSITIVE

## 2021-10-11 RX ORDER — TRAMADOL HYDROCHLORIDE 50 MG/1
0.5 TABLET ORAL
Qty: 6 | Refills: 0
Start: 2021-10-11

## 2021-10-11 RX ORDER — CELECOXIB 200 MG/1
1 CAPSULE ORAL
Qty: 0 | Refills: 0 | DISCHARGE

## 2021-10-11 RX ORDER — ATORVASTATIN CALCIUM 80 MG/1
20 TABLET, FILM COATED ORAL AT BEDTIME
Refills: 0 | Status: DISCONTINUED | OUTPATIENT
Start: 2021-10-11 | End: 2021-10-11

## 2021-10-11 RX ORDER — TRAMADOL HYDROCHLORIDE 50 MG/1
0.5 TABLET ORAL
Qty: 9 | Refills: 0
Start: 2021-10-11 | End: 2021-10-13

## 2021-10-11 RX ORDER — APIXABAN 2.5 MG/1
1 TABLET, FILM COATED ORAL
Qty: 0 | Refills: 0 | DISCHARGE
Start: 2021-10-11

## 2021-10-11 RX ORDER — CARVEDILOL PHOSPHATE 80 MG/1
6.25 CAPSULE, EXTENDED RELEASE ORAL EVERY 12 HOURS
Refills: 0 | Status: DISCONTINUED | OUTPATIENT
Start: 2021-10-11 | End: 2021-10-11

## 2021-10-11 RX ADMIN — Medication 975 MILLIGRAM(S): at 05:10

## 2021-10-11 RX ADMIN — SIMETHICONE 80 MILLIGRAM(S): 80 TABLET, CHEWABLE ORAL at 11:30

## 2021-10-11 RX ADMIN — Medication 600 MILLIGRAM(S): at 11:30

## 2021-10-11 RX ADMIN — PANTOPRAZOLE SODIUM 40 MILLIGRAM(S): 20 TABLET, DELAYED RELEASE ORAL at 11:31

## 2021-10-11 RX ADMIN — INFLUENZA VIRUS VACCINE 0.7 MILLILITER(S): 15; 15; 15; 15 SUSPENSION INTRAMUSCULAR at 11:37

## 2021-10-11 RX ADMIN — Medication 600 MILLIGRAM(S): at 04:40

## 2021-10-11 RX ADMIN — Medication 81 MILLIGRAM(S): at 11:31

## 2021-10-11 RX ADMIN — Medication 975 MILLIGRAM(S): at 11:30

## 2021-10-11 RX ADMIN — Medication 3 MILLIGRAM(S): at 04:41

## 2021-10-11 RX ADMIN — Medication 600 MILLIGRAM(S): at 05:10

## 2021-10-11 RX ADMIN — GABAPENTIN 300 MILLIGRAM(S): 400 CAPSULE ORAL at 11:32

## 2021-10-11 RX ADMIN — DULOXETINE HYDROCHLORIDE 60 MILLIGRAM(S): 30 CAPSULE, DELAYED RELEASE ORAL at 11:31

## 2021-10-11 RX ADMIN — Medication 975 MILLIGRAM(S): at 04:40

## 2021-10-11 NOTE — PROGRESS NOTE ADULT - ASSESSMENT
76y with PMHx HTN, polymyalgia rheumatica POD#2 s/p exploratory laparotomy, total abdominal hysterectomy,  bilateral salpingo-oophorectomy, b/l blocks (frozen = benign left ovary, uterus w/ atypical leiomyoma concerning for possible leiomyosarcoma). 76y with PMHx HTN, polymyalgia rheumatica POD#3 s/p exploratory laparotomy, total abdominal hysterectomy,  bilateral salpingo-oophorectomy, b/l blocks (frozen = benign left ovary, uterus w/ atypical leiomyoma concerning for possible leiomyosarcoma). 76y with PMHx HTN, polymyalgia rheumatica POD#3 s/p exploratory laparotomy, total abdominal hysterectomy,  bilateral salpingo-oophorectomy, b/l blocks (frozen = benign left ovary, uterus w/ atypical leiomyoma concerning for possible leiomyosarcoma). Pt is tolerating regular diet, ambulating and voiding spontaneously.

## 2021-10-11 NOTE — PROGRESS NOTE ADULT - PROBLEM SELECTOR PLAN 2
Fellow Note    Patient seen and examined. Agree with above.    VS reviewed  Labs pending    Reg diet  OOB  VTE ppx  PO analgesia  tyrel Valero MD
GYN ONC Fellow Addendum:    Pt seen and examined at bedside. Agree with above. Pain controlled. Tolerating reg diet w/o n/v. +flatus. Ambulating and voiding.     VS reviewed  No Labs     - Continue current pain regimen  - restart home BP meds  - Reg diet  - Encourage ambulation and IS use  - Replete lytes prn  - DVT ppx  - OOB  - Dispo: d/c home today    ARLET Lundberg, PGY6
Fellow Note    Patient seen and examined. Agree with above.    VS reviewed  Labs reviewed    Reg diet as tolerated  OOB  VTE ppx  PO analgesia when tolerating  Continue jessica Valero MD

## 2021-10-11 NOTE — DISCHARGE NOTE NURSING/CASE MANAGEMENT/SOCIAL WORK - PATIENT PORTAL LINK FT
You can access the FollowMyHealth Patient Portal offered by [facility] by registering at the following website: [email or web address]. By joining [facility]’s FollowMyHealth portal, you will also be able to view your health information using other applications (apps) compatible with our system.

## 2021-10-11 NOTE — PROGRESS NOTE ADULT - SUBJECTIVE AND OBJECTIVE BOX
POD #3     Pt seen and examined at bedside. No overnight events.  Pt without complaints. Pain well controlled on current regimen.   She denies SOB/CP/palpitations, fever/chills, nausea/emesis. Tolerating regular diet.  +OOB,  +Flatus, +Voiding spontaneously    MEDICATIONS  (STANDING):  acetaminophen   Tablet .. 975 milliGRAM(s) Oral every 6 hours  aspirin  chewable 81 milliGRAM(s) Oral daily  dextrose 40% Gel 15 Gram(s) Oral once  dextrose 5%. 1000 milliLiter(s) (50 mL/Hr) IV Continuous <Continuous>  dextrose 5%. 1000 milliLiter(s) (100 mL/Hr) IV Continuous <Continuous>  dextrose 50% Injectable 25 Gram(s) IV Push once  dextrose 50% Injectable 12.5 Gram(s) IV Push once  dextrose 50% Injectable 25 Gram(s) IV Push once  DULoxetine 60 milliGRAM(s) Oral daily  enoxaparin Injectable 40 milliGRAM(s) SubCutaneous daily  gabapentin 300 milliGRAM(s) Oral daily  glucagon  Injectable 1 milliGRAM(s) IntraMuscular once  ibuprofen  Tablet. 600 milliGRAM(s) Oral every 6 hours  influenza  Vaccine (HIGH DOSE) 0.7 milliLiter(s) IntraMuscular once  insulin lispro (ADMELOG) corrective regimen sliding scale   SubCutaneous three times a day before meals  insulin lispro (ADMELOG) corrective regimen sliding scale   SubCutaneous at bedtime  melatonin 3 milliGRAM(s) Oral at bedtime  montelukast 10 milliGRAM(s) Oral at bedtime  pantoprazole  Injectable 40 milliGRAM(s) IV Push daily  predniSONE   Tablet 3 milliGRAM(s) Oral daily  simethicone 80 milliGRAM(s) Chew daily    MEDICATIONS  (PRN):  metoprolol tartrate Injectable 5 milliGRAM(s) IV Push every 6 hours PRN Hypertension  naloxone Injectable 0.1 milliGRAM(s) IV Push every 3 minutes PRN For ANY of the following changes in patient status:  A. Breaths Per Minute LESS THAN 10, B. Oxygen saturation LESS THAN 90%, C. Sedation score of 6 for Stop After: 4 Times  ondansetron Injectable 4 milliGRAM(s) IV Push every 6 hours PRN Nausea and/or Vomiting  traMADol 25 milliGRAM(s) Oral three times a day PRN Moderate Pain (4 - 6)        Vital Signs Last 24 Hrs  T(C): 36.7 (10 Oct 2021 22:25), Max: 37.2 (10 Oct 2021 14:17)  T(F): 98.1 (10 Oct 2021 22:25), Max: 99 (10 Oct 2021 14:17)  HR: 92 (10 Oct 2021 22:25) (73 - 92)  BP: 162/87 (10 Oct 2021 22:25) (133/59 - 162/87)  BP(mean): --  RR: 18 (10 Oct 2021 22:25) (18 - 18)  SpO2: 98% (10 Oct 2021 22:25) (97% - 98%)    10-09 @ 07:01  -  10-10 @ 07:00  --------------------------------------------------------  IN: 3220 mL / OUT: 3450 mL / NET: -230 mL    10-10 @ 07:01  -  10-11 @ 05:52  --------------------------------------------------------  IN: 0 mL / OUT: 2200 mL / NET: -2200 mL        PHYSICAL EXAM:  Gen: NAD, A+O x 3  CV: RRR  Pulm: CTA BL  Abd: Soft, appropriately tender, non distended, no rebound or guarding, +BS  Incision: Clean, dry and intact; Steris in place  Extremities: No swelling or calf tenderness, SCDs in place    Labs, additional tests:             11.0   5.95  )-----------( 199      ( 10-10 @ 16:26 )             33.3                12.3   7.18  )-----------( 190      ( 10-09 @ 09:06 )             38.2                13.7   12.13 )-----------( 253      ( 10-08 @ 16:11 )             41.9       10-10    139  |  106  |  14  ----------------------------<  184<H>  4.5   |  23  |  0.76    Ca    8.4      10 Oct 2021 16:26  Phos  1.5     10-10  Mg     1.90     10-10         POD #3     Pt seen and examined at bedside. No overnight events.  Pt without complaints. Pain well controlled on current regimen.   She denies SOB/CP/palpitations, fever/chills, nausea/emesis. Tolerating regular diet.  +OOB,  +Flatus, +Voiding spontaneously    MEDICATIONS  (STANDING):  acetaminophen   Tablet .. 975 milliGRAM(s) Oral every 6 hours  aspirin  chewable 81 milliGRAM(s) Oral daily  dextrose 40% Gel 15 Gram(s) Oral once  dextrose 5%. 1000 milliLiter(s) (50 mL/Hr) IV Continuous <Continuous>  dextrose 5%. 1000 milliLiter(s) (100 mL/Hr) IV Continuous <Continuous>  dextrose 50% Injectable 25 Gram(s) IV Push once  dextrose 50% Injectable 12.5 Gram(s) IV Push once  dextrose 50% Injectable 25 Gram(s) IV Push once  DULoxetine 60 milliGRAM(s) Oral daily  enoxaparin Injectable 40 milliGRAM(s) SubCutaneous daily  gabapentin 300 milliGRAM(s) Oral daily  glucagon  Injectable 1 milliGRAM(s) IntraMuscular once  ibuprofen  Tablet. 600 milliGRAM(s) Oral every 6 hours  influenza  Vaccine (HIGH DOSE) 0.7 milliLiter(s) IntraMuscular once  insulin lispro (ADMELOG) corrective regimen sliding scale   SubCutaneous three times a day before meals  insulin lispro (ADMELOG) corrective regimen sliding scale   SubCutaneous at bedtime  melatonin 3 milliGRAM(s) Oral at bedtime  montelukast 10 milliGRAM(s) Oral at bedtime  pantoprazole  Injectable 40 milliGRAM(s) IV Push daily  predniSONE   Tablet 3 milliGRAM(s) Oral daily  simethicone 80 milliGRAM(s) Chew daily    MEDICATIONS  (PRN):  metoprolol tartrate Injectable 5 milliGRAM(s) IV Push every 6 hours PRN Hypertension  naloxone Injectable 0.1 milliGRAM(s) IV Push every 3 minutes PRN For ANY of the following changes in patient status:  A. Breaths Per Minute LESS THAN 10, B. Oxygen saturation LESS THAN 90%, C. Sedation score of 6 for Stop After: 4 Times  ondansetron Injectable 4 milliGRAM(s) IV Push every 6 hours PRN Nausea and/or Vomiting  traMADol 25 milliGRAM(s) Oral three times a day PRN Moderate Pain (4 - 6)        Vital Signs Last 24 Hrs  T(C): 36.7 (10 Oct 2021 22:25), Max: 37.2 (10 Oct 2021 14:17)  T(F): 98.1 (10 Oct 2021 22:25), Max: 99 (10 Oct 2021 14:17)  HR: 92 (10 Oct 2021 22:25) (73 - 92)  BP: 162/87 (10 Oct 2021 22:25) (133/59 - 162/87)  BP(mean): --  RR: 18 (10 Oct 2021 22:25) (18 - 18)  SpO2: 98% (10 Oct 2021 22:25) (97% - 98%)    10-09 @ 07:01  -  10-10 @ 07:00  --------------------------------------------------------  IN: 3220 mL / OUT: 3450 mL / NET: -230 mL    10-10 @ 07:01  -  10-11 @ 05:52  --------------------------------------------------------  IN: 0 mL / OUT: 2200 mL / NET: -2200 mL        PHYSICAL EXAM:  Gen: NAD, A+O x 3  CV: RRR  Pulm: CTA BL  Abd: Soft, appropriately tender, non distended, no rebound or guarding, +BS  Incision: midline vertical incision CDI  Extremities: No swelling or calf tenderness, SCDs in place    Labs, additional tests:             11.0   5.95  )-----------( 199      ( 10-10 @ 16:26 )             33.3                12.3   7.18  )-----------( 190      ( 10-09 @ 09:06 )             38.2                13.7   12.13 )-----------( 253      ( 10-08 @ 16:11 )             41.9       10-10    139  |  106  |  14  ----------------------------<  184<H>  4.5   |  23  |  0.76    Ca    8.4      10 Oct 2021 16:26  Phos  1.5     10-10  Mg     1.90     10-10

## 2021-10-11 NOTE — PROGRESS NOTE ADULT - PROBLEM SELECTOR PLAN 1
Neuro: Tylenol/Toradol. On home Gabapentin, Duloxetine  CV: Hemodynamically stable, Lopressor PRN, f/u AM CBC/BMP/Mg/Phos  Pulm: Saturating well on RA. Nasal cannula overnight. Increase incentive spirometry.  GI: Regular diet  : Voiding spontaneously   FEN: SLIV  Heme: Continue HSQ/Venodynes for DVT ppx. Transition to Lovenox. Increase OOB.    ID: Afebrile  Endo: ISS, on home dose of Prednisone  Dispo: evaluation for d/c today    Tracey Lopez, PGY-2 Neuro: Tylenol/Toradol. On home Gabapentin, Duloxetine  CV: Hemodynamically stable, Lopressor PRN  Pulm: Saturating well on RA. Nasal cannula overnight. Increase incentive spirometry.  GI: Regular diet  : Voiding spontaneously   FEN: SLIV  Heme: Continue Lovenox/Venodynes for DVT ppx. Increase OOB.    ID: Afebrile  Endo: ISS, on home dose of Prednisone  Dispo: evaluation for d/c today    Tracey Lopez, PGY-2

## 2021-10-11 NOTE — DISCHARGE NOTE NURSING/CASE MANAGEMENT/SOCIAL WORK - NSDCVIVACCINE_GEN_ALL_CORE_FT
No Vaccines Administered. influenza, high-dose, quadrivalent; 11-Oct-2021 11:37; Yaquelin Escalante ([place]); [employer]; [id number] (Exp. Date: 30-Jun-2022); IntraMuscular; Deltoid Right.; 0.7 milliLiter(s); VIS (VIS Published: 15-Aug-2019, VIS Presented: 11-Oct-2021);

## 2021-10-11 NOTE — PROGRESS NOTE ADULT - ATTENDING COMMENTS
advance to reg diet  lopez until tomorrow  advance to PO pain meds  lovenox/scd's
Patient seen and evaluated.   Doing well after surgery.   Agree with assessment and plan as above.
Patient seen and evaluated.   Meeting postoperative milestones.   Stable to DC home today.

## 2021-10-11 NOTE — DISCHARGE NOTE NURSING/CASE MANAGEMENT/SOCIAL WORK - NSDCPNINST_GEN_ALL_CORE
Maintain incision and dressing clean dry and intact. Call MD with any signs of infection (i.e.: fever, redness, drainage), or with signs of bleeding, unrelieved increased pain, or persistent nausea. Continue to drink plenty of fluids. Avoid strenuous activity and constipation which may be a side effect from taking certain medications and narcotics.  Safety and fall prevention measures reinforced

## 2021-10-14 ENCOUNTER — NON-APPOINTMENT (OUTPATIENT)
Age: 76
End: 2021-10-14

## 2021-10-14 DIAGNOSIS — Z72.9: ICD-10-CM

## 2021-10-25 LAB — SURGICAL PATHOLOGY STUDY: SIGNIFICANT CHANGE UP

## 2021-10-28 ENCOUNTER — APPOINTMENT (OUTPATIENT)
Dept: GYNECOLOGIC ONCOLOGY | Facility: CLINIC | Age: 76
End: 2021-10-28
Payer: MEDICARE

## 2021-10-28 VITALS — SYSTOLIC BLOOD PRESSURE: 155 MMHG | HEART RATE: 95 BPM | DIASTOLIC BLOOD PRESSURE: 82 MMHG

## 2021-10-28 DIAGNOSIS — R39.15 URGENCY OF URINATION: ICD-10-CM

## 2021-10-28 DIAGNOSIS — R19.00 INTRA-ABDOMINAL AND PELVIC SWELLING, MASS AND LUMP, UNSPECIFIED SITE: ICD-10-CM

## 2021-10-28 PROCEDURE — 99024 POSTOP FOLLOW-UP VISIT: CPT

## 2021-10-29 LAB
APPEARANCE: CLEAR
BACTERIA: NEGATIVE
BILIRUBIN URINE: NEGATIVE
BLOOD URINE: NEGATIVE
COLOR: COLORLESS
GLUCOSE QUALITATIVE U: NEGATIVE
HYALINE CASTS: 0 /LPF
KETONES URINE: NEGATIVE
LEUKOCYTE ESTERASE URINE: NEGATIVE
MICROSCOPIC-UA: NORMAL
NITRITE URINE: NEGATIVE
PH URINE: 6.5
PROTEIN URINE: NEGATIVE
RED BLOOD CELLS URINE: 2 /HPF
SPECIFIC GRAVITY URINE: 1.01
SQUAMOUS EPITHELIAL CELLS: 0 /HPF
UROBILINOGEN URINE: NORMAL
WHITE BLOOD CELLS URINE: 0 /HPF

## 2021-10-30 PROBLEM — R19.00 PELVIC MASS: Noted: 2021-09-27

## 2021-10-30 LAB — BACTERIA UR CULT: NORMAL

## 2021-11-02 ENCOUNTER — NON-APPOINTMENT (OUTPATIENT)
Age: 76
End: 2021-11-02

## 2021-11-03 NOTE — LETTER BODY
[Dear  ___] : Dear  [unfilled], [I recently saw our patient [unfilled] for a follow-up visit.] : I recently saw our patient, [unfilled] for a follow-up visit. [Attached please find my note.] : Attached please find my note. [FreeTextEntry2] : She underwent JAVIER/BSO and is healing well. Pathology demonstrated a uterine STUMP and she will see me for surveillance visits as well as her routine GYN followup with you. I will keep you updated on her progress. Thank you again for referring this angelo patient.\par \par  [FreeTextEntry1] : pathology 10/8/21

## 2021-11-03 NOTE — REASON FOR VISIT
[Post Op] : post op visit [de-identified] : 10/8/21 [de-identified] : JAVIER LEAL, BSO [de-identified] : She reports that she has had a very smooth recovery with no major issues. She denies abnl vaginal bleeding, pelvic or abdominal pain or bowel complaints. She has a h/o urinary urge incontinence and since surgery this is more pronounced as she is hydrating and making more urine. No longer requiring pain medication. Returning to usual activities and maintaining pelvic rest.\par

## 2021-11-03 NOTE — DISCUSSION/SUMMARY
[Clean] : was clean [Dry] : was dry [Intact] : was intact [None] : had no drainage [Normal Skin] : normal appearance [Doing Well] : is doing well [Excellent Pain Control] : has excellent pain control [No Sign of Infection] : is showing no signs of infection [1] : 1 [Erythema] : was not erythematous [Ecchymosis] : was not ecchymotic [Seroma] : had no seroma [Firm] : soft [Tender] : nontender [Rebound] : no rebound tenderness [Guarding] : no guarding [Incisional Hernia] : no incisional hernia [Mass] : no palpable mass [External Genitalia Abnormal] : normal external genitalia [Vaginal Exam Abnormal] : normal vaginal exam [de-identified] : cuff healing well.  [de-identified] : normal [de-identified] : regular diet. continue to maintain pelvic rest x 6 more weeks. refrain from heavy lifting and strenuous exercise x 6 more weeks.\par  [FreeTextEntry1] : Pathology: Final Diagnosis\par \par 1. Fallopian tube and ovary, left, salpingo-oophorectomy:\par - Serous cystadenoma, ovary\par - Unremarkable fimbriated fallopian tube with complete cross section\par \par 2. Fallopian tube and ovary, right, salpingo-oophorectomy:\par - Benign ovary with physiological changes\par - Unremarkable fimbriated fallopian tube with complete cross section\par \par 3. 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 "Fibroid", excision\par - Compatible with leiomyoma with extensive degenerative changes\par  Use the 5 A's (Ask, Advise, Assess, Assist, Arrange)

## 2021-11-04 ENCOUNTER — NON-APPOINTMENT (OUTPATIENT)
Age: 76
End: 2021-11-04

## 2021-11-19 PROBLEM — F41.9 ANXIETY DISORDER, UNSPECIFIED: Chronic | Status: ACTIVE | Noted: 2021-10-06

## 2021-11-19 PROBLEM — Z87.39 PERSONAL HISTORY OF OTHER DISEASES OF THE MUSCULOSKELETAL SYSTEM AND CONNECTIVE TISSUE: Chronic | Status: ACTIVE | Noted: 2021-10-06

## 2021-11-19 PROBLEM — I10 ESSENTIAL (PRIMARY) HYPERTENSION: Chronic | Status: ACTIVE | Noted: 2021-10-06

## 2021-11-19 PROBLEM — R39.15 URGENCY OF URINATION: Chronic | Status: ACTIVE | Noted: 2021-10-06

## 2021-11-19 PROBLEM — R91.1 SOLITARY PULMONARY NODULE: Chronic | Status: ACTIVE | Noted: 2021-10-06

## 2021-11-19 PROBLEM — D21.9 BENIGN NEOPLASM OF CONNECTIVE AND OTHER SOFT TISSUE, UNSPECIFIED: Chronic | Status: ACTIVE | Noted: 2021-10-06

## 2021-11-19 PROBLEM — M19.90 UNSPECIFIED OSTEOARTHRITIS, UNSPECIFIED SITE: Chronic | Status: ACTIVE | Noted: 2021-10-06

## 2021-12-02 NOTE — ASU PATIENT PROFILE, ADULT - TEACHING/LEARNING EDUCATIONAL LEVEL
Medicare Annual Wellness Visit  Name: Veronica Baker Date: 2021   MRN: [id number] Sex: Male   Age: 78 y.o. Ethnicity: Non- / Non    : 1954 Race: White (non-)      Francisco Javier Calabrese is here for Medicare AWV    Screenings for behavioral, psychosocial and functional/safety risks, and cognitive dysfunction are all negative except as indicated below. These results, as well as other patient data from the [address] form, are documented in Flowsheets linked to this Encounter. No Known Allergies    Prior to Visit Medications    Medication Sig Taking? Authorizing Provider   Multiple Vitamins-Minerals (THERAPEUTIC MULTIVITAMIN-MINERALS) tablet Take 1 tablet by mouth as needed Yes Historical Provider, MD   lansoprazole (PREVACID) 15 MG delayed release capsule Take 15 mg by mouth as needed  Yes Historical Provider, MD   gabapentin (NEURONTIN) 100 MG capsule Take 2 capsules by mouth nightly for 90 days.  Intended supply: 90 days  [place], [place]       Past Medical History:   Diagnosis Date    GERD (gastroesophageal reflux disease) 2019    Hx of colonic polyp 2019       Past Surgical History:   Procedure Laterality Date    CHOLECYSTECTOMY      NASAL SEPTUM SURGERY         Family History   Problem Relation Age of Onset    Diabetes Mother     Heart Disease Father     High Blood Pressure Father     No Known Problems Sister     No Known Problems Brother     No Known Problems Sister     No Known Problems Sister     No Known Problems Brother        CareTeam (Including outside providers/suppliers regularly involved in providing care):   Patient Care Team:  Marcelina Wilburn MD as PCP - Vannessa Silva MD as PCP - [facility] Empaneled Provider    Wt Readings from Last 3 Encounters:   21 254 lb 9.6 oz (115.5 kg)   21 255 lb 4.8 oz (115.8 kg)   11/15/21 251 lb (113.9 kg)     Vitals:    21 0833   BP: 124/76   Site: Left Upper Arm Position: Sitting   Cuff Size: Medium Adult   Pulse: 56   SpO2: 97%   Weight: 254 lb 9.6 oz (115.5 kg)   Height: 5' 9\" (1.753 m)     Body mass index is 37.6 kg/m². Based upon direct observation of the patient, evaluation of cognition reveals recent and remote memory intact. Patient's complete Health Risk Assessment and screening values have been reviewed and are found in Flowsheets. The following problems were reviewed today and where indicated follow up appointments were made and/or referrals ordered. Positive Risk Factor Screenings with Interventions:          General Health and ACP:  General  In general, how would you say your health is?: Good  In the past 7 days, have you experienced any of the following?  New or Increased Pain, New or Increased Fatigue, Loneliness, Social Isolation, Stress or Anger?: None of These  Do you get the social and emotional support that you need?: Yes  Do you have a Living Will?: (!) No  Advance Directives     Power of [address] [place] [address] Will ACP-Advance Directive ACP-Power of     Not on File Not on File Not on File Not on File      General Health Risk Interventions:  · No Living Will: Advance Care Planning addressed with patient today    Health Habits/Nutrition:  Health Habits/Nutrition  Do you exercise for at least 20 minutes 2-3 times per week?: Yes  Have you lost any weight without trying in the past 3 months?: No  Do you eat only one meal per day?: No  Have you seen the dentist within the past year?: (!) No  Body mass index: (!) 37.59  Health Habits/Nutrition Interventions:  · Nutritional issues:  educational materials for healthy, well-balanced diet provided, educational materials to promote weight loss provided  · Dental exam overdue:  patient encouraged to make appointment with his/her dentist     Safety:  Safety  Do you have working smoke detectors?: Yes  Have all throw rugs been removed or fastened?: Yes  Do you have non-slip mats or surfaces in all graduate school

## 2021-12-21 ENCOUNTER — APPOINTMENT (OUTPATIENT)
Dept: GYNECOLOGIC ONCOLOGY | Facility: CLINIC | Age: 76
End: 2021-12-21
Payer: MEDICARE

## 2021-12-21 VITALS
DIASTOLIC BLOOD PRESSURE: 83 MMHG | HEART RATE: 78 BPM | SYSTOLIC BLOOD PRESSURE: 150 MMHG | WEIGHT: 178 LBS | BODY MASS INDEX: 30.39 KG/M2 | HEIGHT: 64 IN

## 2021-12-21 DIAGNOSIS — Z08 ENCOUNTER FOR FOLLOW-UP EXAMINATION AFTER COMPLETED TREATMENT FOR MALIGNANT NEOPLASM: ICD-10-CM

## 2021-12-21 DIAGNOSIS — Z85.42 ENCOUNTER FOR FOLLOW-UP EXAMINATION AFTER COMPLETED TREATMENT FOR MALIGNANT NEOPLASM: ICD-10-CM

## 2021-12-21 PROCEDURE — 99024 POSTOP FOLLOW-UP VISIT: CPT

## 2022-06-14 ENCOUNTER — NON-APPOINTMENT (OUTPATIENT)
Age: 77
End: 2022-06-14

## 2022-06-14 ENCOUNTER — APPOINTMENT (OUTPATIENT)
Dept: GYNECOLOGIC ONCOLOGY | Facility: CLINIC | Age: 77
End: 2022-06-14

## 2022-07-27 ENCOUNTER — NON-APPOINTMENT (OUTPATIENT)
Age: 77
End: 2022-07-27

## 2022-09-20 ENCOUNTER — APPOINTMENT (OUTPATIENT)
Dept: GYNECOLOGIC ONCOLOGY | Facility: CLINIC | Age: 77
End: 2022-09-20

## 2022-09-20 VITALS
SYSTOLIC BLOOD PRESSURE: 148 MMHG | HEART RATE: 76 BPM | BODY MASS INDEX: 28.85 KG/M2 | HEIGHT: 64 IN | WEIGHT: 169 LBS | DIASTOLIC BLOOD PRESSURE: 88 MMHG

## 2022-09-20 PROCEDURE — 99213 OFFICE O/P EST LOW 20 MIN: CPT

## 2022-09-20 RX ORDER — ALENDRONATE SODIUM 70 MG/1
70 TABLET ORAL
Refills: 0 | Status: ACTIVE | COMMUNITY

## 2022-09-20 RX ORDER — CHLORDIAZEPOXIDE HYDROCHLORIDE AND CLIDINIUM BROMIDE 5; 2.5 MG/1; MG/1
5-2.5 CAPSULE ORAL 3 TIMES DAILY
Qty: 60 | Refills: 0 | Status: DISCONTINUED | COMMUNITY
Start: 2019-05-31 | End: 2022-09-20

## 2022-09-21 ENCOUNTER — NON-APPOINTMENT (OUTPATIENT)
Age: 77
End: 2022-09-21

## 2022-10-05 ENCOUNTER — APPOINTMENT (OUTPATIENT)
Dept: CT IMAGING | Facility: CLINIC | Age: 77
End: 2022-10-05

## 2022-10-05 ENCOUNTER — OUTPATIENT (OUTPATIENT)
Dept: OUTPATIENT SERVICES | Facility: HOSPITAL | Age: 77
LOS: 1 days | End: 2022-10-05
Payer: MEDICARE

## 2022-10-05 DIAGNOSIS — D39.0 NEOPLASM OF UNCERTAIN BEHAVIOR OF UTERUS: ICD-10-CM

## 2022-10-05 DIAGNOSIS — R59.9 ENLARGED LYMPH NODES, UNSPECIFIED: Chronic | ICD-10-CM

## 2022-10-05 DIAGNOSIS — N63.10 UNSPECIFIED LUMP IN THE RIGHT BREAST, UNSPECIFIED QUADRANT: Chronic | ICD-10-CM

## 2022-10-05 DIAGNOSIS — Z90.49 ACQUIRED ABSENCE OF OTHER SPECIFIED PARTS OF DIGESTIVE TRACT: Chronic | ICD-10-CM

## 2022-10-05 PROCEDURE — 74177 CT ABD & PELVIS W/CONTRAST: CPT

## 2022-10-05 PROCEDURE — 74177 CT ABD & PELVIS W/CONTRAST: CPT | Mod: 26,MH

## 2022-10-05 PROCEDURE — 71260 CT THORAX DX C+: CPT | Mod: 26,MH

## 2022-10-05 PROCEDURE — 71260 CT THORAX DX C+: CPT

## 2022-10-18 ENCOUNTER — NON-APPOINTMENT (OUTPATIENT)
Age: 77
End: 2022-10-18

## 2022-11-24 ENCOUNTER — NON-APPOINTMENT (OUTPATIENT)
Age: 77
End: 2022-11-24

## 2022-11-30 ENCOUNTER — OFFICE (OUTPATIENT)
Dept: URBAN - METROPOLITAN AREA CLINIC 102 | Facility: CLINIC | Age: 77
Setting detail: OPHTHALMOLOGY
End: 2022-11-30
Payer: MEDICARE

## 2022-11-30 DIAGNOSIS — H25.13: ICD-10-CM

## 2022-11-30 DIAGNOSIS — H16.222: ICD-10-CM

## 2022-11-30 DIAGNOSIS — H35.363: ICD-10-CM

## 2022-11-30 DIAGNOSIS — H16.221: ICD-10-CM

## 2022-11-30 PROBLEM — H16.223 DRY EYE SYNDROME K SICCA; RIGHT EYE, LEFT EYE, BOTH EYES: Status: ACTIVE | Noted: 2022-11-30

## 2022-11-30 PROCEDURE — 92134 CPTRZ OPH DX IMG PST SGM RTA: CPT | Performed by: OPHTHALMOLOGY

## 2022-11-30 PROCEDURE — 83861 MICROFLUID ANALY TEARS: CPT | Performed by: OPHTHALMOLOGY

## 2022-11-30 PROCEDURE — 92020 GONIOSCOPY: CPT | Performed by: OPHTHALMOLOGY

## 2022-11-30 ASSESSMENT — REFRACTION_MANIFEST
OS_CYLINDER: 0.00
OS_AXIS: 000
OD_CYLINDER: -1.00
OS_SPHERE: -2.75
OS_VA1: 20/40
OD_SPHERE: 0.00
OD_AXIS: 080

## 2022-11-30 ASSESSMENT — LID EXAM ASSESSMENTS
OD_BLEPHARITIS: RLL RUL T
OS_BLEPHARITIS: LLL LUL T

## 2022-11-30 ASSESSMENT — REFRACTION_AUTOREFRACTION
OS_AXIS: 096
OD_CYLINDER: -0.75
OS_CYLINDER: -0.50
OD_AXIS: 077
OD_SPHERE: +0.25
OS_SPHERE: -2.50

## 2022-11-30 ASSESSMENT — DECREASING TEAR LAKE - SEVERITY SCORE
OS_DEC_TEARLAKE: 1+
OD_DEC_TEARLAKE: 1+

## 2022-11-30 ASSESSMENT — CONFRONTATIONAL VISUAL FIELD TEST (CVF)
OS_FINDINGS: FULL
OD_FINDINGS: FULL

## 2022-11-30 ASSESSMENT — TONOMETRY
OS_IOP_MMHG: 14
OD_IOP_MMHG: 15

## 2022-11-30 ASSESSMENT — VISUAL ACUITY
OS_BCVA: 20/25+2
OD_BCVA: 20/100-

## 2022-11-30 ASSESSMENT — SPHEQUIV_DERIVED
OD_SPHEQUIV: -0.5
OS_SPHEQUIV: -2.75
OS_SPHEQUIV: -2.75
OD_SPHEQUIV: -0.125

## 2022-12-20 ENCOUNTER — APPOINTMENT (OUTPATIENT)
Dept: GYNECOLOGIC ONCOLOGY | Facility: CLINIC | Age: 77
End: 2022-12-20

## 2022-12-20 VITALS
BODY MASS INDEX: 30.73 KG/M2 | HEIGHT: 64 IN | DIASTOLIC BLOOD PRESSURE: 81 MMHG | WEIGHT: 180 LBS | SYSTOLIC BLOOD PRESSURE: 131 MMHG | HEART RATE: 74 BPM

## 2022-12-20 PROCEDURE — 99213 OFFICE O/P EST LOW 20 MIN: CPT

## 2022-12-20 RX ORDER — HYOSCYAMINE SULFATE 0.12 MG/1
0.12 TABLET ORAL
Refills: 0 | Status: DISCONTINUED | COMMUNITY
End: 2022-12-20

## 2022-12-20 NOTE — HISTORY OF PRESENT ILLNESS
[FreeTextEntry1] : Ms. Kohler, 76 years old, was referred by Dr. Olivia Law, for an enlarging uterine mass on imaging.\par SHe is now s/p ExLap, JAVIER, BSO on 10/8/21.  \par Final pathology: \par 1. Fallopian tube and ovary, left, salpingo-oophorectomy:\par - Serous cystadenoma, ovary\par - Unremarkable fimbriated fallopian tube with complete cross section\par 2. Fallopian tube and ovary, right, salpingo-oophorectomy:\par - Benign ovary with physiological changes\par - Unremarkable fimbriated fallopian tube with complete cross section\par 3. 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 "Fibroid", excision\par - Compatible with leiomyoma with extensive degenerative changes\par \par Comment:  Intramural tumor measures 9.5 x 7.5 x 6.7 cm . Tumor has been extensively sampled and examined thoroughly. Smooth muscle neoplasm exhibits focal moderate to severe nuclear atypia. There are foci of hemorrhage, degenerative changes and focal ischemic necrosis. However, there are no foci of "tumor/coagulative necrosis". Mitotic activity ranges from 5/10 hpf  to 13/10hpf with average rate of 11 mitotic figures/10 hpf. On immunohistochemistry, tumor cells are diffusely positive for Desmin and H-Caldesmon. Tumor cells are negative for\par HMB-45. Morphological features and immunoprofile is compatible with above diagnosis.\par \par GYN Oncology Tumor Board:  21\par Diagnosis:  STUMP\par Recommendation:  Observation\par \par IMAGING: \par PREOP: CT C/A/P 21: Peripherally calcified RML nodule stable from prior; 8.8 x 8 x 7.6 cm heterogenously enhancing uterine mass is enlarged from prior study (2019). No hydroureter, however fullness of the collecting systems. \par CT C/A/P 10/5/22 - JULIO; stable pulmonary nodules; no new findings\par \par PMH:\par -  HTN\par -  HLD\par -  T2DM\par -  Polymyalgia\par -  Fibromyalgia\par -  History of a lung nodule (followed by Dr. Andrea Esposito - [place])\par -  Irritable Bowel Syndrome\par -  Extreme Fear of Dying (her mother  when she was young)\par \par PSH:  \par -  Open appendectomy \par -  Bilateral breast biopsies - both benign - in her 50's\par - hip replacement\par \par She had a fall and underwent repair of a pelvic fracture in [place] 2022 and subsequent L hip replacement 22 in [place] and was in rehab, so she missed a few months of followup. \par She is feeling well and denies any VB or other associated signs or symptoms.\par \par Health Maintenance:\par COVID vaccine received:  Yes\par Mammogram:  BIRADS2 2022\par Colonoscopy: 10/4/2018\par PAP: Summer, 2021 - negative per patient.\par Stress Test/Echo/Carotid Doppler:  [place], [place] - \par \par GYN/Ref:  Dr. Olivia Bhakta\par Urologist:  Dr. Samantha Ayoub ([place])\par PCP:  Dr. Yunier Hendrix\par GI:  Dr. Nails\par Neuro:  Dr. Andre Villanueva\par CT surgeon:  Dr. Colt Esposito ([place])\par Cards:  [facility] in [place], [place] \par

## 2022-12-20 NOTE — PHYSICAL EXAM
[Chaperone Present] : A chaperone was present in the examining room during all aspects of the physical examination [Absent] : Adnexa(ae): Absent [Normal] : Recto-Vaginal Exam: Normal [Fully active, able to carry on all pre-disease performance without restriction] : Status 0 - Fully active, able to carry on all pre-disease performance without restriction [FreeTextEntry1] : Mi [de-identified] : open appy scar RLQ [de-identified] : cuff well-healed [de-identified] : no mass nor tenderness, cuff mobile [de-identified] : normal sphincter tone, smooth rectovaginal septum, no cul-de-sac nodules.

## 2022-12-20 NOTE — LETTER BODY
[Dear  ___] : Dear  [unfilled], [I recently saw our patient [unfilled] for a follow-up visit.] : I recently saw our patient, [unfilled] for a follow-up visit. [Attached please find my note.] : Attached please find my note. [FreeTextEntry2] : She will be seeing me for regular surveillance visits, and see you annually for GYN health maintenance.  I will keep you updated on her progress. Thank you again for referring this angelo patient.\par \par  [FreeTextEntry1] : CT

## 2023-05-23 ENCOUNTER — APPOINTMENT (OUTPATIENT)
Dept: GYNECOLOGIC ONCOLOGY | Facility: CLINIC | Age: 78
End: 2023-05-23
Payer: MEDICARE

## 2023-05-23 ENCOUNTER — RESULT REVIEW (OUTPATIENT)
Age: 78
End: 2023-05-23

## 2023-05-23 VITALS
HEART RATE: 76 BPM | SYSTOLIC BLOOD PRESSURE: 121 MMHG | WEIGHT: 175 LBS | BODY MASS INDEX: 29.88 KG/M2 | HEIGHT: 64 IN | DIASTOLIC BLOOD PRESSURE: 72 MMHG

## 2023-05-23 PROCEDURE — 99213 OFFICE O/P EST LOW 20 MIN: CPT

## 2023-05-23 RX ORDER — ALPRAZOLAM 0.25 MG/1
0.25 TABLET ORAL
Refills: 0 | Status: DISCONTINUED | COMMUNITY
Start: 2021-10-04 | End: 2023-05-23

## 2023-06-06 ENCOUNTER — OFFICE (OUTPATIENT)
Dept: URBAN - METROPOLITAN AREA CLINIC 102 | Facility: CLINIC | Age: 78
Setting detail: OPHTHALMOLOGY
End: 2023-06-06
Payer: MEDICARE

## 2023-06-06 DIAGNOSIS — H35.363: ICD-10-CM

## 2023-06-06 DIAGNOSIS — H16.223: ICD-10-CM

## 2023-06-06 DIAGNOSIS — E11.9: ICD-10-CM

## 2023-06-06 DIAGNOSIS — H25.13: ICD-10-CM

## 2023-06-06 DIAGNOSIS — H01.005: ICD-10-CM

## 2023-06-06 DIAGNOSIS — H01.004: ICD-10-CM

## 2023-06-06 DIAGNOSIS — H16.221: ICD-10-CM

## 2023-06-06 DIAGNOSIS — H35.3131: ICD-10-CM

## 2023-06-06 DIAGNOSIS — H16.222: ICD-10-CM

## 2023-06-06 DIAGNOSIS — H01.001: ICD-10-CM

## 2023-06-06 DIAGNOSIS — H01.002: ICD-10-CM

## 2023-06-06 DIAGNOSIS — H43.393: ICD-10-CM

## 2023-06-06 PROCEDURE — 99214 OFFICE O/P EST MOD 30 MIN: CPT | Performed by: OPHTHALMOLOGY

## 2023-06-06 PROCEDURE — 92134 CPTRZ OPH DX IMG PST SGM RTA: CPT | Performed by: OPHTHALMOLOGY

## 2023-06-06 ASSESSMENT — CONFRONTATIONAL VISUAL FIELD TEST (CVF)
OS_FINDINGS: FULL
OD_FINDINGS: FULL

## 2023-06-06 ASSESSMENT — VISUAL ACUITY
OD_BCVA: 20/300
OS_BCVA: 20/50

## 2023-06-06 ASSESSMENT — KERATOMETRY
OD_K1POWER_DIOPTERS: 47.00
METHOD_AUTO_MANUAL: AUTO
OS_K2POWER_DIOPTERS: 47.75
OD_AXISANGLE_DEGREES: 110
OS_K1POWER_DIOPTERS: 46.50
OD_K2POWER_DIOPTERS: 47.50
OS_AXISANGLE_DEGREES: 070

## 2023-06-06 ASSESSMENT — SPHEQUIV_DERIVED
OD_SPHEQUIV: -0.625
OS_SPHEQUIV: -3.375
OS_SPHEQUIV: -2.75
OD_SPHEQUIV: -0.5
OD_SPHEQUIV: -0.75
OS_SPHEQUIV: -3.5

## 2023-06-06 ASSESSMENT — LID EXAM ASSESSMENTS
OD_BLEPHARITIS: RLL RUL T
OS_BLEPHARITIS: LLL LUL T

## 2023-06-06 ASSESSMENT — AXIALLENGTH_DERIVED
OS_AL: 23.6241
OS_AL: 23.3347
OS_AL: 23.5753
OD_AL: 22.51
OD_AL: 22.5544
OD_AL: 22.4658

## 2023-06-06 ASSESSMENT — REFRACTION_MANIFEST
OD_SPHERE: -0.50
OS_SPHERE: -3.00
OD_SPHERE: 0.00
OD_AXIS: 080
OS_AXIS: 085
OS_VA1: 20/50-
OD_AXIS: 085
OS_AXIS: 000
OS_SPHERE: -2.75
OS_CYLINDER: -1.00
OD_CYLINDER: -1.00
OS_VA1: 20/40
OD_CYLINDER: -0.50
OD_VA1: 20/25
OS_CYLINDER: 0.00

## 2023-06-06 ASSESSMENT — DECREASING TEAR LAKE - SEVERITY SCORE
OD_DEC_TEARLAKE: T
OS_DEC_TEARLAKE: T

## 2023-06-06 ASSESSMENT — REFRACTION_AUTOREFRACTION
OS_CYLINDER: -0.75
OD_CYLINDER: -0.75
OD_SPHERE: -0.25
OS_AXIS: 083
OS_SPHERE: -3.00
OD_AXIS: 086

## 2023-06-06 ASSESSMENT — TONOMETRY
OS_IOP_MMHG: 15
OD_IOP_MMHG: 12

## 2023-06-26 ENCOUNTER — OUTPATIENT (OUTPATIENT)
Dept: OUTPATIENT SERVICES | Facility: HOSPITAL | Age: 78
LOS: 1 days | End: 2023-06-26
Payer: MEDICARE

## 2023-06-26 ENCOUNTER — APPOINTMENT (OUTPATIENT)
Dept: CT IMAGING | Facility: CLINIC | Age: 78
End: 2023-06-26
Payer: MEDICARE

## 2023-06-26 DIAGNOSIS — R39.0 EXTRAVASATION OF URINE: ICD-10-CM

## 2023-06-26 DIAGNOSIS — Z90.49 ACQUIRED ABSENCE OF OTHER SPECIFIED PARTS OF DIGESTIVE TRACT: Chronic | ICD-10-CM

## 2023-06-26 DIAGNOSIS — N63.10 UNSPECIFIED LUMP IN THE RIGHT BREAST, UNSPECIFIED QUADRANT: Chronic | ICD-10-CM

## 2023-06-26 DIAGNOSIS — R59.9 ENLARGED LYMPH NODES, UNSPECIFIED: Chronic | ICD-10-CM

## 2023-06-26 PROCEDURE — 74177 CT ABD & PELVIS W/CONTRAST: CPT

## 2023-06-26 PROCEDURE — 74177 CT ABD & PELVIS W/CONTRAST: CPT | Mod: 26,MH

## 2023-07-14 ENCOUNTER — NON-APPOINTMENT (OUTPATIENT)
Age: 78
End: 2023-07-14

## 2023-07-26 NOTE — LETTER BODY
[Dear  ___] : Dear  [unfilled], [I recently saw our patient [unfilled] for a follow-up visit.] : I recently saw our patient, [unfilled] for a follow-up visit. [Attached please find my note.] : Attached please find my note. [FreeTextEntry2] : She will be seeing me for regular surveillance visits, and see you annually for GYN health maintenance.  I will keep you updated on her progress. Thank you again for referring this angelo patient.\par \par  [FreeTextEntry1] : CTA/P

## 2023-07-26 NOTE — HISTORY OF PRESENT ILLNESS
[FreeTextEntry1] : Ms. Kohler, 78 years old, was referred by Dr. Olivia Law, for an enlarging uterine mass on imaging.\par SHe is now s/p ExLap, JAVIER, BSO on 10/8/21.  \par Final pathology: \par 1. Fallopian tube and ovary, left, salpingo-oophorectomy:\par - Serous cystadenoma, ovary\par - Unremarkable fimbriated fallopian tube with complete cross section\par 2. Fallopian tube and ovary, right, salpingo-oophorectomy:\par - Benign ovary with physiological changes\par - Unremarkable fimbriated fallopian tube with complete cross section\par 3. 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 "Fibroid", excision\par - Compatible with leiomyoma with extensive degenerative changes\par \par Comment:  Intramural tumor measures 9.5 x 7.5 x 6.7 cm . Tumor has been extensively sampled and examined thoroughly. Smooth muscle neoplasm exhibits focal moderate to severe nuclear atypia. There are foci of hemorrhage, degenerative changes and focal ischemic necrosis. However, there are no foci of "tumor/coagulative necrosis". Mitotic activity ranges from 5/10 hpf  to 13/10hpf with average rate of 11 mitotic figures/10 hpf. On immunohistochemistry, tumor cells are diffusely positive for Desmin and H-Caldesmon. Tumor cells are negative for\par HMB-45. Morphological features and immunoprofile is compatible with above diagnosis.\par \par GYN Oncology Tumor Board:  21\par Diagnosis:  STUMP\par Recommendation:  Observation\par \par IMAGING: \par PREOP: CT C/A/P 21: Peripherally calcified RML nodule stable from prior; 8.8 x 8 x 7.6 cm heterogenously enhancing uterine mass is enlarged from prior study (2019). No hydroureter, however fullness of the collecting systems. \par CT C/A/P 10/5/22 - JULIO; stable pulmonary nodules; no new findings\par Reports that she recently had a chest CT (is followed by Dr. Esposito at [facility] (thoracic) for the lung nodules.\par \par PMH:\par -  HTN\par -  HLD\par -  T2DM\par -  Polymyalgia\par -  Fibromyalgia\par -  History of a lung nodule (followed by Dr. Andrea Esposito - [place])\par -  Irritable Bowel Syndrome\par -  Extreme Fear of Dying (her mother  when she was young)\par \par PSH:  \par -  Open appendectomy \par -  Bilateral breast biopsies - both benign - in her 50's\par - hip replacement\par \par She had a fall and underwent repair of a pelvic fracture in [place] 2022 and subsequent L hip replacement 22 in [facility] and was in rehab, so she missed a few months of followup. \par She is feeling well and denies any VB or other associated signs or symptoms.\par \par Health Maintenance:\par COVID vaccine received:  Yes\par Mammogram:  BIRADS2 2022\par Colonoscopy: 10/4/2018\par PAP: Summer, 2021 - negative per patient.\par Stress Test/Echo/Carotid Doppler:  [place], [place] - \par \par GYN/Ref:  Dr. Olivia Bhakta\par Urologist:  Dr. Samantha Ayoub ([place])\par PCP:  Dr. Yunier Hendrix\par GI:  Dr. Nails\par Neuro:  Dr. Andre Villanueva\par CT surgeon:  Dr. Colt Esposito ([place] [facility])\par Cards:  [facility] in [place], [place]

## 2023-07-26 NOTE — PHYSICAL EXAM
[Chaperone Present] : A chaperone was present in the examining room during all aspects of the physical examination [Absent] : Adnexa(ae): Absent [Normal] : Recto-Vaginal Exam: Normal [Fully active, able to carry on all pre-disease performance without restriction] : Status 0 - Fully active, able to carry on all pre-disease performance without restriction [FreeTextEntry1] : Mi [de-identified] : open appy scar RLQ [de-identified] : cuff well-healed [de-identified] : no mass nor tenderness, cuff mobile [de-identified] : normal sphincter tone, smooth rectovaginal septum, no cul-de-sac nodules.

## 2023-07-31 ENCOUNTER — OFFICE (OUTPATIENT)
Dept: URBAN - METROPOLITAN AREA CLINIC 102 | Facility: CLINIC | Age: 78
Setting detail: OPHTHALMOLOGY
End: 2023-07-31
Payer: MEDICARE

## 2023-07-31 DIAGNOSIS — H01.005: ICD-10-CM

## 2023-07-31 DIAGNOSIS — H35.3131: ICD-10-CM

## 2023-07-31 DIAGNOSIS — H01.002: ICD-10-CM

## 2023-07-31 DIAGNOSIS — H16.222: ICD-10-CM

## 2023-07-31 DIAGNOSIS — H25.12: ICD-10-CM

## 2023-07-31 DIAGNOSIS — E11.9: ICD-10-CM

## 2023-07-31 DIAGNOSIS — H16.221: ICD-10-CM

## 2023-07-31 DIAGNOSIS — H01.001: ICD-10-CM

## 2023-07-31 DIAGNOSIS — H01.004: ICD-10-CM

## 2023-07-31 DIAGNOSIS — H35.363: ICD-10-CM

## 2023-07-31 DIAGNOSIS — H43.393: ICD-10-CM

## 2023-07-31 DIAGNOSIS — H25.13: ICD-10-CM

## 2023-07-31 PROCEDURE — 99213 OFFICE O/P EST LOW 20 MIN: CPT | Performed by: OPHTHALMOLOGY

## 2023-07-31 PROCEDURE — 92136 OPHTHALMIC BIOMETRY: CPT | Performed by: OPHTHALMOLOGY

## 2023-07-31 ASSESSMENT — REFRACTION_AUTOREFRACTION
OS_CYLINDER: -0.50
OS_AXIS: 087
OD_CYLINDER: -0.25
OD_SPHERE: -0.75
OS_SPHERE: -3.50
OD_AXIS: 063

## 2023-07-31 ASSESSMENT — REFRACTION_MANIFEST
OD_SPHERE: 0.00
OS_SPHERE: -2.75
OD_CYLINDER: -0.50
OS_AXIS: 000
OD_CYLINDER: -1.00
OS_CYLINDER: 0.00
OS_VA1: 20/50-
OD_SPHERE: -0.50
OD_AXIS: 085
OS_VA1: 20/40
OS_AXIS: 085
OD_AXIS: 080
OS_CYLINDER: -1.00
OD_VA1: 20/25
OS_SPHERE: -3.00

## 2023-07-31 ASSESSMENT — SPHEQUIV_DERIVED
OD_SPHEQUIV: -0.875
OS_SPHEQUIV: -2.75
OD_SPHEQUIV: -0.75
OD_SPHEQUIV: -0.5
OS_SPHEQUIV: -3.5
OS_SPHEQUIV: -3.75

## 2023-07-31 ASSESSMENT — CONFRONTATIONAL VISUAL FIELD TEST (CVF)
OD_FINDINGS: FULL
OS_FINDINGS: FULL

## 2023-07-31 ASSESSMENT — DECREASING TEAR LAKE - SEVERITY SCORE
OS_DEC_TEARLAKE: T
OD_DEC_TEARLAKE: T

## 2023-07-31 ASSESSMENT — VISUAL ACUITY
OD_BCVA: 20/FC 3FT
OS_BCVA: 20/30-1

## 2023-07-31 ASSESSMENT — LID EXAM ASSESSMENTS
OS_BLEPHARITIS: LLL LUL T
OD_BLEPHARITIS: RLL RUL T

## 2023-07-31 ASSESSMENT — TONOMETRY
OS_IOP_MMHG: 14
OD_IOP_MMHG: 15

## 2023-08-04 ENCOUNTER — NON-APPOINTMENT (OUTPATIENT)
Age: 78
End: 2023-08-04

## 2023-08-30 ENCOUNTER — ASC (OUTPATIENT)
Dept: URBAN - METROPOLITAN AREA SURGERY 8 | Facility: SURGERY | Age: 78
Setting detail: OPHTHALMOLOGY
End: 2023-08-30
Payer: MEDICARE

## 2023-08-30 DIAGNOSIS — H25.12: ICD-10-CM

## 2023-08-30 DIAGNOSIS — H52.212: ICD-10-CM

## 2023-08-30 PROCEDURE — 66984 XCAPSL CTRC RMVL W/O ECP: CPT | Performed by: OPHTHALMOLOGY

## 2023-08-30 PROCEDURE — V2787 ASTIGMATISM-CORRECT FUNCTION: HCPCS | Performed by: OPHTHALMOLOGY

## 2023-08-30 PROCEDURE — A9270 NON-COVERED ITEM OR SERVICE: HCPCS | Performed by: OPHTHALMOLOGY

## 2023-08-30 PROCEDURE — FEMTO FEMTOSECOND LASER: Performed by: OPHTHALMOLOGY

## 2023-08-31 ENCOUNTER — OFFICE (OUTPATIENT)
Dept: URBAN - METROPOLITAN AREA CLINIC 102 | Facility: CLINIC | Age: 78
Setting detail: OPHTHALMOLOGY
End: 2023-08-31
Payer: MEDICARE

## 2023-08-31 DIAGNOSIS — Z96.1: ICD-10-CM

## 2023-08-31 PROCEDURE — 99024 POSTOP FOLLOW-UP VISIT: CPT | Performed by: OPHTHALMOLOGY

## 2023-08-31 ASSESSMENT — REFRACTION_MANIFEST
OD_CYLINDER: -1.00
OS_SPHERE: -3.00
OS_VA1: 20/40
OD_AXIS: 085
OS_CYLINDER: 0.00
OD_VA1: 20/25
OS_SPHERE: -2.75
OS_CYLINDER: -1.00
OD_AXIS: 080
OD_CYLINDER: -0.50
OS_AXIS: 000
OD_SPHERE: 0.00
OS_AXIS: 085
OD_SPHERE: -0.50
OS_VA1: 20/50-

## 2023-08-31 ASSESSMENT — VISUAL ACUITY
OS_BCVA: 20/40+2
OD_BCVA: 20/30-1

## 2023-08-31 ASSESSMENT — DECREASING TEAR LAKE - SEVERITY SCORE
OS_DEC_TEARLAKE: T
OD_DEC_TEARLAKE: T

## 2023-08-31 ASSESSMENT — SPHEQUIV_DERIVED
OS_SPHEQUIV: 0.25
OD_SPHEQUIV: -0.5
OS_SPHEQUIV: -2.75
OD_SPHEQUIV: -0.625
OD_SPHEQUIV: -0.75
OS_SPHEQUIV: -3.5

## 2023-08-31 ASSESSMENT — LID EXAM ASSESSMENTS
OD_BLEPHARITIS: RLL RUL T
OS_BLEPHARITIS: LLL LUL T

## 2023-08-31 ASSESSMENT — REFRACTION_AUTOREFRACTION
OS_AXIS: 133
OD_AXIS: 076
OS_CYLINDER: -0.50
OS_SPHERE: +0.50
OD_SPHERE: -0.25
OD_CYLINDER: -0.75

## 2023-08-31 ASSESSMENT — CONFRONTATIONAL VISUAL FIELD TEST (CVF)
OD_FINDINGS: FULL
OS_FINDINGS: FULL

## 2023-08-31 ASSESSMENT — TONOMETRY
OS_IOP_MMHG: 18
OD_IOP_MMHG: 16

## 2023-08-31 ASSESSMENT — CORNEAL EDEMA CLINICAL DESCRIPTION: OS_CORNEALEDEMA: T

## 2023-09-07 ENCOUNTER — OFFICE (OUTPATIENT)
Dept: URBAN - METROPOLITAN AREA CLINIC 102 | Facility: CLINIC | Age: 78
Setting detail: OPHTHALMOLOGY
End: 2023-09-07
Payer: MEDICARE

## 2023-09-07 DIAGNOSIS — Z96.1: ICD-10-CM

## 2023-09-07 PROBLEM — H25.11 CATARACT SENILE NUCLEAR SCLEROSIS; RIGHT EYE: Status: ACTIVE | Noted: 2023-08-31

## 2023-09-07 PROCEDURE — 99024 POSTOP FOLLOW-UP VISIT: CPT | Performed by: OPHTHALMOLOGY

## 2023-09-07 ASSESSMENT — REFRACTION_AUTOREFRACTION
OS_CYLINDER: 0.00
OS_AXIS: 000
OD_SPHERE: -0.50
OD_AXIS: 079
OD_CYLINDER: -0.50
OS_SPHERE: +0.50

## 2023-09-07 ASSESSMENT — AXIALLENGTH_DERIVED
OD_AL: 22.5544
OD_AL: 22.5544
OD_AL: 22.4658
OS_AL: 23.3797
OS_AL: 23.6702
OS_AL: 22.1989

## 2023-09-07 ASSESSMENT — REFRACTION_MANIFEST
OS_VA1: 20/40
OD_SPHERE: 0.00
OD_CYLINDER: -0.50
OD_AXIS: 080
OS_CYLINDER: 0.00
OD_AXIS: 085
OS_SPHERE: -2.75
OS_CYLINDER: -1.00
OS_SPHERE: -3.00
OS_AXIS: 000
OS_AXIS: 085
OD_CYLINDER: -1.00
OD_VA1: 20/25
OD_SPHERE: -0.50
OS_VA1: 20/50-

## 2023-09-07 ASSESSMENT — SPHEQUIV_DERIVED
OS_SPHEQUIV: -3.5
OD_SPHEQUIV: -0.75
OS_SPHEQUIV: 0.5
OD_SPHEQUIV: -0.5
OD_SPHEQUIV: -0.75
OS_SPHEQUIV: -2.75

## 2023-09-07 ASSESSMENT — KERATOMETRY
OD_AXISANGLE_DEGREES: 104
OS_K2POWER_DIOPTERS: 47.75
OD_K2POWER_DIOPTERS: 47.75
OS_AXISANGLE_DEGREES: 076
OS_K1POWER_DIOPTERS: 46.25
METHOD_AUTO_MANUAL: AUTO
OD_K1POWER_DIOPTERS: 46.75

## 2023-09-07 ASSESSMENT — VISUAL ACUITY
OD_BCVA: 20/30
OS_BCVA: 20/40-2

## 2023-09-07 ASSESSMENT — TONOMETRY
OS_IOP_MMHG: 14
OD_IOP_MMHG: 15

## 2023-09-07 ASSESSMENT — LID EXAM ASSESSMENTS
OD_BLEPHARITIS: RLL RUL T
OS_BLEPHARITIS: LLL LUL T

## 2023-09-07 ASSESSMENT — DECREASING TEAR LAKE - SEVERITY SCORE
OS_DEC_TEARLAKE: T
OD_DEC_TEARLAKE: T

## 2023-09-07 ASSESSMENT — CONFRONTATIONAL VISUAL FIELD TEST (CVF)
OD_FINDINGS: FULL
OS_FINDINGS: FULL

## 2023-09-11 ENCOUNTER — NON-APPOINTMENT (OUTPATIENT)
Age: 78
End: 2023-09-11

## 2023-09-18 ENCOUNTER — NON-APPOINTMENT (OUTPATIENT)
Age: 78
End: 2023-09-18

## 2023-09-20 ENCOUNTER — OFFICE (OUTPATIENT)
Dept: URBAN - METROPOLITAN AREA CLINIC 70 | Facility: CLINIC | Age: 78
Setting detail: OPHTHALMOLOGY
End: 2023-09-20
Payer: MEDICARE

## 2023-09-20 DIAGNOSIS — H16.223: ICD-10-CM

## 2023-09-20 PROCEDURE — 99213 OFFICE O/P EST LOW 20 MIN: CPT | Performed by: STUDENT IN AN ORGANIZED HEALTH CARE EDUCATION/TRAINING PROGRAM

## 2023-09-20 ASSESSMENT — REFRACTION_MANIFEST
OS_AXIS: 085
OD_AXIS: 085
OS_SPHERE: -2.75
OD_SPHERE: -0.50
OS_CYLINDER: -1.00
OS_CYLINDER: 0.00
OD_AXIS: 080
OS_SPHERE: -3.00
OS_AXIS: 000
OD_CYLINDER: -1.00
OD_SPHERE: 0.00
OS_VA1: 20/40
OD_VA1: 20/25
OS_VA1: 20/50-
OD_CYLINDER: -0.50

## 2023-09-20 ASSESSMENT — LID EXAM ASSESSMENTS
OS_BLEPHARITIS: LLL LUL T
OD_BLEPHARITIS: RLL RUL T

## 2023-09-20 ASSESSMENT — REFRACTION_AUTOREFRACTION
OS_CYLINDER: 0.00
OD_AXIS: 079
OS_SPHERE: +0.50
OD_SPHERE: -0.50
OS_AXIS: 000
OD_CYLINDER: -0.50

## 2023-09-20 ASSESSMENT — KERATOMETRY
OD_K1POWER_DIOPTERS: 46.75
OD_K2POWER_DIOPTERS: 47.75
METHOD_AUTO_MANUAL: AUTO
OS_K2POWER_DIOPTERS: 47.75
OS_AXISANGLE_DEGREES: 076
OD_AXISANGLE_DEGREES: 104
OS_K1POWER_DIOPTERS: 46.25

## 2023-09-20 ASSESSMENT — AXIALLENGTH_DERIVED
OS_AL: 22.1989
OD_AL: 22.5544
OS_AL: 23.6702
OD_AL: 22.5544
OS_AL: 23.3797
OD_AL: 22.4658

## 2023-09-20 ASSESSMENT — SPHEQUIV_DERIVED
OS_SPHEQUIV: -3.5
OD_SPHEQUIV: -0.75
OS_SPHEQUIV: -2.75
OD_SPHEQUIV: -0.5
OD_SPHEQUIV: -0.75
OS_SPHEQUIV: 0.5

## 2023-09-20 ASSESSMENT — CORNEAL DYSTROPHY
OS_DYSTROPHY: SUPERIORLY
OD_DYSTROPHY: SUPERIORLY

## 2023-09-20 ASSESSMENT — VISUAL ACUITY
OS_BCVA: 20/40-2
OD_BCVA: 20/25

## 2023-09-20 ASSESSMENT — DECREASING TEAR LAKE - SEVERITY SCORE
OS_DEC_TEARLAKE: T
OD_DEC_TEARLAKE: T

## 2023-09-20 ASSESSMENT — CONFRONTATIONAL VISUAL FIELD TEST (CVF)
OD_FINDINGS: FULL
OS_FINDINGS: FULL

## 2023-09-26 ENCOUNTER — APPOINTMENT (OUTPATIENT)
Dept: GYNECOLOGIC ONCOLOGY | Facility: CLINIC | Age: 78
End: 2023-09-26
Payer: MEDICARE

## 2023-09-26 VITALS
WEIGHT: 180 LBS | HEART RATE: 74 BPM | BODY MASS INDEX: 30.73 KG/M2 | DIASTOLIC BLOOD PRESSURE: 80 MMHG | SYSTOLIC BLOOD PRESSURE: 124 MMHG | HEIGHT: 64 IN

## 2023-09-26 DIAGNOSIS — R91.1 SOLITARY PULMONARY NODULE: ICD-10-CM

## 2023-09-26 PROCEDURE — 99213 OFFICE O/P EST LOW 20 MIN: CPT

## 2023-09-28 ENCOUNTER — OFFICE (OUTPATIENT)
Dept: URBAN - METROPOLITAN AREA CLINIC 102 | Facility: CLINIC | Age: 78
Setting detail: OPHTHALMOLOGY
End: 2023-09-28
Payer: MEDICARE

## 2023-09-28 DIAGNOSIS — Z96.1: ICD-10-CM

## 2023-09-28 DIAGNOSIS — H16.223: ICD-10-CM

## 2023-09-28 PROCEDURE — 99024 POSTOP FOLLOW-UP VISIT: CPT | Performed by: OPHTHALMOLOGY

## 2023-09-28 ASSESSMENT — AXIALLENGTH_DERIVED
OS_AL: 23.2898
OS_AL: 23.5781
OD_AL: 22.56
OD_AL: 22.56
OS_AL: 22.2471
OD_AL: 22.47

## 2023-09-28 ASSESSMENT — TEAR BREAK UP TIME (TBUT)
OD_TBUT: 4+
OS_TBUT: 4+

## 2023-09-28 ASSESSMENT — KERATOMETRY
OS_K1POWER_DIOPTERS: 46.50
OD_K2POWER_DIOPTERS: 47.74
METHOD_AUTO_MANUAL: AUTO
OS_AXISANGLE_DEGREES: 081
OD_K1POWER_DIOPTERS: 46.75
OD_AXISANGLE_DEGREES: 111
OS_K2POWER_DIOPTERS: 48.00

## 2023-09-28 ASSESSMENT — CORNEAL DYSTROPHY
OS_DYSTROPHY: SUPERIORLY
OD_DYSTROPHY: SUPERIORLY

## 2023-09-28 ASSESSMENT — SPHEQUIV_DERIVED
OS_SPHEQUIV: -3.5
OD_SPHEQUIV: -0.5
OD_SPHEQUIV: -0.75
OS_SPHEQUIV: -2.75
OD_SPHEQUIV: -0.75
OS_SPHEQUIV: 0.125

## 2023-09-28 ASSESSMENT — CONFRONTATIONAL VISUAL FIELD TEST (CVF)
OS_FINDINGS: FULL
OD_FINDINGS: FULL

## 2023-09-28 ASSESSMENT — REFRACTION_MANIFEST
OS_CYLINDER: 0.00
OS_AXIS: 085
OD_AXIS: 085
OS_AXIS: 000
OD_CYLINDER: -1.00
OS_CYLINDER: -1.00
OD_SPHERE: 0.00
OS_SPHERE: -3.00
OD_AXIS: 080
OD_CYLINDER: -0.50
OD_VA1: 20/25
OS_VA1: 20/40
OS_SPHERE: -2.75
OS_VA1: 20/50-
OD_SPHERE: -0.50

## 2023-09-28 ASSESSMENT — REFRACTION_AUTOREFRACTION
OD_SPHERE: -0.50
OS_CYLINDER: -0.25
OS_SPHERE: +0.25
OS_AXIS: 029
OD_CYLINDER: -0.50
OD_AXIS: 078

## 2023-09-28 ASSESSMENT — VISUAL ACUITY
OD_BCVA: 20/20
OS_BCVA: 20/40-2

## 2023-09-28 ASSESSMENT — TONOMETRY
OD_IOP_MMHG: 16
OS_IOP_MMHG: 10

## 2023-09-28 ASSESSMENT — LID EXAM ASSESSMENTS
OD_BLEPHARITIS: RLL RUL T
OS_BLEPHARITIS: LLL LUL T

## 2023-12-19 ENCOUNTER — APPOINTMENT (OUTPATIENT)
Dept: GYNECOLOGIC ONCOLOGY | Facility: CLINIC | Age: 78
End: 2023-12-19
Payer: MEDICARE

## 2023-12-19 VITALS
WEIGHT: 175 LBS | BODY MASS INDEX: 29.88 KG/M2 | SYSTOLIC BLOOD PRESSURE: 133 MMHG | HEART RATE: 81 BPM | HEIGHT: 64 IN | DIASTOLIC BLOOD PRESSURE: 81 MMHG

## 2023-12-19 PROCEDURE — 99213 OFFICE O/P EST LOW 20 MIN: CPT

## 2023-12-20 ENCOUNTER — OFFICE (OUTPATIENT)
Dept: URBAN - METROPOLITAN AREA CLINIC 102 | Facility: CLINIC | Age: 78
Setting detail: OPHTHALMOLOGY
End: 2023-12-20
Payer: MEDICARE

## 2023-12-20 ENCOUNTER — RX ONLY (RX ONLY)
Age: 78
End: 2023-12-20

## 2023-12-20 DIAGNOSIS — H25.11: ICD-10-CM

## 2023-12-20 DIAGNOSIS — H35.3131: ICD-10-CM

## 2023-12-20 DIAGNOSIS — E11.9: ICD-10-CM

## 2023-12-20 DIAGNOSIS — Z96.1: ICD-10-CM

## 2023-12-20 DIAGNOSIS — H01.004: ICD-10-CM

## 2023-12-20 DIAGNOSIS — H43.393: ICD-10-CM

## 2023-12-20 DIAGNOSIS — H35.363: ICD-10-CM

## 2023-12-20 DIAGNOSIS — H01.001: ICD-10-CM

## 2023-12-20 DIAGNOSIS — H16.223: ICD-10-CM

## 2023-12-20 DIAGNOSIS — H11.153: ICD-10-CM

## 2023-12-20 DIAGNOSIS — H01.005: ICD-10-CM

## 2023-12-20 DIAGNOSIS — H01.002: ICD-10-CM

## 2023-12-20 PROCEDURE — 92134 CPTRZ OPH DX IMG PST SGM RTA: CPT | Performed by: OPHTHALMOLOGY

## 2023-12-20 PROCEDURE — 92201 OPSCPY EXTND RTA DRAW UNI/BI: CPT | Performed by: OPHTHALMOLOGY

## 2023-12-20 PROCEDURE — 92014 COMPRE OPH EXAM EST PT 1/>: CPT | Performed by: OPHTHALMOLOGY

## 2023-12-20 ASSESSMENT — SPHEQUIV_DERIVED
OD_SPHEQUIV: -1.125
OS_SPHEQUIV: 0.125
OD_SPHEQUIV: -1.125
OS_SPHEQUIV: -2.75

## 2023-12-20 ASSESSMENT — REFRACTION_CURRENTRX
OD_OVR_VA: 20/
OS_OVR_VA: 20/
OS_CYLINDER: -0.75
OS_SPHERE: +2.50
OS_AXIS: 123
OD_CYLINDER: -0.50
OD_AXIS: 018
OD_SPHERE: +3.25

## 2023-12-20 ASSESSMENT — REFRACTION_MANIFEST
OS_SPHERE: PLANO
OS_VA1: 20/40
OS_SPHERE: -2.75
OD_CYLINDER: -1.00
OS_VA1: 20/20
OS_CYLINDER: 0.00
OD_AXIS: 080
OD_SPHERE: PLANO
OD_VA1: 20/25
OD_CYLINDER: -0.25
OD_SPHERE: -1.00
OD_AXIS: 080
OS_AXIS: 000

## 2023-12-20 ASSESSMENT — REFRACTION_AUTOREFRACTION
OS_AXIS: 084
OD_CYLINDER: -0.25
OS_CYLINDER: -0.25
OD_SPHERE: -1.00
OD_AXIS: 080
OS_SPHERE: +0.25

## 2023-12-20 ASSESSMENT — CORNEAL DYSTROPHY
OD_DYSTROPHY: SUPERIORLY
OS_DYSTROPHY: SUPERIORLY

## 2023-12-20 ASSESSMENT — LID EXAM ASSESSMENTS
OS_BLEPHARITIS: LLL LUL T
OD_BLEPHARITIS: RLL RUL T

## 2023-12-20 ASSESSMENT — CONFRONTATIONAL VISUAL FIELD TEST (CVF)
OS_FINDINGS: FULL
OD_FINDINGS: FULL

## 2023-12-21 ENCOUNTER — APPOINTMENT (OUTPATIENT)
Dept: CT IMAGING | Facility: CLINIC | Age: 78
End: 2023-12-21
Payer: MEDICARE

## 2023-12-21 ENCOUNTER — OUTPATIENT (OUTPATIENT)
Dept: OUTPATIENT SERVICES | Facility: HOSPITAL | Age: 78
LOS: 1 days | End: 2023-12-21
Payer: MEDICARE

## 2023-12-21 DIAGNOSIS — Z90.49 ACQUIRED ABSENCE OF OTHER SPECIFIED PARTS OF DIGESTIVE TRACT: Chronic | ICD-10-CM

## 2023-12-21 DIAGNOSIS — N63.10 UNSPECIFIED LUMP IN THE RIGHT BREAST, UNSPECIFIED QUADRANT: Chronic | ICD-10-CM

## 2023-12-21 DIAGNOSIS — R59.9 ENLARGED LYMPH NODES, UNSPECIFIED: Chronic | ICD-10-CM

## 2023-12-21 DIAGNOSIS — D39.0 NEOPLASM OF UNCERTAIN BEHAVIOR OF UTERUS: ICD-10-CM

## 2023-12-21 PROCEDURE — 71260 CT THORAX DX C+: CPT | Mod: 26,MH

## 2023-12-21 PROCEDURE — 74177 CT ABD & PELVIS W/CONTRAST: CPT

## 2023-12-21 PROCEDURE — 71260 CT THORAX DX C+: CPT

## 2023-12-21 PROCEDURE — 74177 CT ABD & PELVIS W/CONTRAST: CPT | Mod: 26,MH

## 2023-12-21 NOTE — DISCUSSION/SUMMARY
[Reviewed Clinical Lab Test(s)] : Results of clinical tests were reviewed. [Reviewed Radiology Report(s)] : Radiology reports were reviewed. [FreeTextEntry1] : - CT C/A/P ordered (addendum: CT 12/21/23 is JULIO; I called patient with results and all questions were answered. LDS) - The risk of recurrence, signs and symptoms and surveillance plan were reviewed in detail.  -  reviewed. - She was advised to see me in 6 months.  - All questions were answered to her apparent satisfaction.

## 2023-12-21 NOTE — PHYSICAL EXAM
[Chaperone Present] : A chaperone was present in the examining room during all aspects of the physical examination [Absent] : Adnexa(ae): Absent [Normal] : Recto-Vaginal Exam: Normal [Fully active, able to carry on all pre-disease performance without restriction] : Status 0 - Fully active, able to carry on all pre-disease performance without restriction [FreeTextEntry1] : Mi [de-identified] : open appy scar RLQ [de-identified] : cuff well-healed [de-identified] : no mass nor tenderness, cuff mobile [de-identified] : normal sphincter tone, smooth rectovaginal septum, no cul-de-sac nodules.

## 2023-12-21 NOTE — LETTER BODY
[Dear  ___] : Dear  [unfilled], [I recently saw our patient [unfilled] for a follow-up visit.] : I recently saw our patient, [unfilled] for a follow-up visit. [Attached please find my note.] : Attached please find my note. [FreeTextEntry2] : She will be seeing me for regular surveillance visits, and see you annually for GYN health maintenance.  I will keep you updated on her progress. Thank you again for referring this angelo patient.   [FreeTextEntry1] : CT C/A/P

## 2023-12-21 NOTE — HISTORY OF PRESENT ILLNESS
[FreeTextEntry1] : Ms. Kohler was referred by Dr. Olivia Law for an enlarging uterine mass on imaging. SHe is now s/p ExLap, JAVIER, BSO on 10/8/21.   Final pathology:  1. Fallopian tube and ovary, left, salpingo-oophorectomy: - Serous cystadenoma, ovary - Unremarkable fimbriated fallopian tube with complete cross section 2. Fallopian tube and ovary, right, salpingo-oophorectomy: - Benign ovary with physiological changes - Unremarkable fimbriated fallopian tube with complete cross section 3. 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 "Fibroid", excision - Compatible with leiomyoma with extensive degenerative changes  Comment:  Intramural tumor measures 9.5 x 7.5 x 6.7 cm . Tumor has been extensively sampled and examined thoroughly. Smooth muscle neoplasm exhibits focal moderate to severe nuclear atypia. There are foci of hemorrhage, degenerative changes and focal ischemic necrosis. However, there are no foci of "tumor/coagulative necrosis". Mitotic activity ranges from 5/10 hpf  to 13/10hpf with average rate of 11 mitotic figures/10 hpf. On immunohistochemistry, tumor cells are diffusely positive for Desmin and H-Caldesmon. Tumor cells are negative for HMB-45. Morphological features and immunoprofile is compatible with above diagnosis.  GYN Oncology Tumor Board:  21 Diagnosis:  STUMP Recommendation:  Observation IMAGING:  PREOP: CT C/A/P 21: Peripherally calcified RML nodule stable from prior; 8.8 x 8 x 7.6 cm heterogenously enhancing uterine mass is enlarged from prior study (2019). No hydroureter, however fullness of the collecting systems.  CT C/A/P 10/5/22 - JULIO; stable pulmonary nodules; no new findings Reports that she recently had a chest CT (is followed by Dr. Esposito at [place] (thoracic) for the lung nodules. CT Chest 2023 : stable CT/A/P 2023: JULIO  PMH: -  HTN -  HLD -  T2DM -  Polymyalgia -  Fibromyalgia -  History of a lung nodule (followed by Dr. Andrea Wilson) -  Irritable Bowel Syndrome -  Extreme Fear of Dying (her mother  when she was young)  PSH:   -  Open appendectomy  -  Bilateral breast biopsies - both benign - in her 50's - hip replacement  She had a fall and underwent repair of a pelvic fracture in [place] 2022 and subsequent L hip replacement 22 in [facility] and was in rehab, so she missed a few months of followup.  She is otherwise feeling well and denies any VB or other associated signs or symptoms.  Health Maintenance: COVID vaccine received:  Yes Mammogram: 2023 per pt was negative in [place] Colonoscopy/upper endoscopy 23 ; f/u 5years PAP: Summer, 2021 - negative per patient. Stress Test/Echo/Carotid Doppler:  [place], [place] -   GYN/Ref:  Dr. Olivia Bhakta Urologist:  Dr. Samantha Ayoub ([place]) PCP:  Dr. Yunier Hendrix GI:  Dr. Nails Neuro:  Dr. Andre Villanueva CT surgeon:  Dr. Colt Esposito ([place]) Cards:  [facility] in [place], [place]

## 2024-06-11 ENCOUNTER — APPOINTMENT (OUTPATIENT)
Dept: GYNECOLOGIC ONCOLOGY | Facility: CLINIC | Age: 79
End: 2024-06-11
Payer: MEDICARE

## 2024-06-11 VITALS
HEIGHT: 64 IN | WEIGHT: 165 LBS | SYSTOLIC BLOOD PRESSURE: 155 MMHG | BODY MASS INDEX: 28.17 KG/M2 | HEART RATE: 79 BPM | DIASTOLIC BLOOD PRESSURE: 95 MMHG

## 2024-06-11 DIAGNOSIS — D39.0 NEOPLASM OF UNCERTAIN BEHAVIOR OF UTERUS: ICD-10-CM

## 2024-06-11 PROCEDURE — 99213 OFFICE O/P EST LOW 20 MIN: CPT

## 2024-06-11 PROCEDURE — 99459 PELVIC EXAMINATION: CPT

## 2024-06-11 NOTE — DISCUSSION/SUMMARY
[Reviewed Clinical Lab Test(s)] : Results of clinical tests were reviewed. [Reviewed Radiology Report(s)] : Radiology reports were reviewed. [FreeTextEntry1] : - CT A/P ordered for Dec, sje will do at [facility], same time as chest CT for Dr Esposito - The risk of recurrence, signs and symptoms and surveillance plan were reviewed in detail.  - HM reviewed. - She was advised to see me in 6 months.  - All questions were answered to her apparent satisfaction.

## 2024-06-11 NOTE — ASSESSMENT
[FreeTextEntry1] : 79 year old female with h/o resected STUMP, clinically without evidence of disease.

## 2024-06-11 NOTE — HISTORY OF PRESENT ILLNESS
[FreeTextEntry1] : Ms. Kohler was referred by Dr. Olivia Law for an enlarging uterine mass on imaging. SHe is now s/p ExLap, JAVIER, BSO on 10/8/21.   Final pathology:  1. Fallopian tube and ovary, left, salpingo-oophorectomy: - Serous cystadenoma, ovary - Unremarkable fimbriated fallopian tube with complete cross section 2. Fallopian tube and ovary, right, salpingo-oophorectomy: - Benign ovary with physiological changes - Unremarkable fimbriated fallopian tube with complete cross section 3. 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 "Fibroid", excision - Compatible with leiomyoma with extensive degenerative changes  Comment:  Intramural tumor measures 9.5 x 7.5 x 6.7 cm . Tumor has been extensively sampled and examined thoroughly. Smooth muscle neoplasm exhibits focal moderate to severe nuclear atypia. There are foci of hemorrhage, degenerative changes and focal ischemic necrosis. However, there are no foci of "tumor/coagulative necrosis". Mitotic activity ranges from 5/10 hpf  to 13/10hpf with average rate of 11 mitotic figures/10 hpf. On immunohistochemistry, tumor cells are diffusely positive for Desmin and H-Caldesmon. Tumor cells are negative for HMB-45. Morphological features and immunoprofile is compatible with above diagnosis.  GYN Oncology Tumor Board:  21 Diagnosis:  STUMP Recommendation:  Observation IMAGING:  PREOP: CT C/A/P 21: Peripherally calcified RML nodule stable from prior; 8.8 x 8 x 7.6 cm heterogenously enhancing uterine mass is enlarged from prior study (2019). No hydroureter, however fullness of the collecting systems.  CT C/A/P 10/5/22 - JULIO; stable pulmonary nodules; no new findings  **chest CT is followed by Dr. Esposito at [place] (thoracic) for the lung nodules. CT Chest 2023 : stable CT/A/P 2023: JULIO CT/A/P 2023: JULIO CT 2024 (in [place]) - compared to 2023 scan in [place], slight increase in size of one nodule. ; per Dr. Esposito, a 6m f/u is scheduled.   PMH: -  HTN -  HLD -  T2DM -  Polymyalgia -  Fibromyalgia -  History of a lung nodule (followed by Dr. Andrea Esposito - [place]) -  Irritable Bowel Syndrome -  Extreme Fear of Dying (her mother  when she was young)  PSH:   -  Open appendectomy  -  Bilateral breast biopsies - both benign - in her 50's - hip replacement  She had a fall and underwent repair of a pelvic fracture in [place] 2022 and subsequent L hip replacement 22 in [facility] and was in rehab, so she missed a few months of followup.  She is feeling well and denies any VB or other associated signs or symptoms.  Health Maintenance: COVID vaccine received:  Yes Mammogram: 10/2023 BIRADS1 in [place] Colonoscopy/upper endoscopy 23 ; f/u 5years PAP: Summer, 2021 - negative per patient. Stress Test/Echo/Carotid Doppler:  [place], [place] -   GYN/Ref:  Dr. Olivia Bhakta Urologist:  Dr. Samantha Ayoub ([place]) PCP:  Dr. Yunier Hendrix GI:  Dr. Nails Neuro:  Dr. Andre Villanueva CT surgeon:  Dr. Colt Esposito ([place]) Cards:  [facility] in [place], [place]

## 2024-06-11 NOTE — PHYSICAL EXAM
[Chaperone Present] : A chaperone was present in the examining room during all aspects of the physical examination [Absent] : Adnexa(ae): Absent [Normal] : Recto-Vaginal Exam: Normal [Fully active, able to carry on all pre-disease performance without restriction] : Status 0 - Fully active, able to carry on all pre-disease performance without restriction [68093] : A chaperone was present during the pelvic exam. [FreeTextEntry2] : Mi [de-identified] : open appy scar RLQ [de-identified] : cuff well-healed [de-identified] : no mass nor tenderness, cuff mobile [de-identified] : normal sphincter tone, smooth rectovaginal septum, no cul-de-sac nodules.

## 2024-06-19 ENCOUNTER — OFFICE (OUTPATIENT)
Dept: URBAN - METROPOLITAN AREA CLINIC 102 | Facility: CLINIC | Age: 79
Setting detail: OPHTHALMOLOGY
End: 2024-06-19
Payer: MEDICARE

## 2024-06-19 DIAGNOSIS — H35.3131: ICD-10-CM

## 2024-06-19 DIAGNOSIS — H16.222: ICD-10-CM

## 2024-06-19 DIAGNOSIS — H16.223: ICD-10-CM

## 2024-06-19 DIAGNOSIS — H35.363: ICD-10-CM

## 2024-06-19 DIAGNOSIS — H16.221: ICD-10-CM

## 2024-06-19 DIAGNOSIS — E11.9: ICD-10-CM

## 2024-06-19 PROBLEM — H01.004 BLEPHARITIS; RIGHT UPPER LID, LEFT UPPER LID: Status: ACTIVE | Noted: 2024-06-19

## 2024-06-19 PROBLEM — H01.001 BLEPHARITIS; RIGHT UPPER LID, LEFT UPPER LID: Status: ACTIVE | Noted: 2024-06-19

## 2024-06-19 PROCEDURE — 92134 CPTRZ OPH DX IMG PST SGM RTA: CPT | Performed by: OPHTHALMOLOGY

## 2024-06-19 PROCEDURE — 92014 COMPRE OPH EXAM EST PT 1/>: CPT | Performed by: OPHTHALMOLOGY

## 2024-06-19 PROCEDURE — 83861 MICROFLUID ANALY TEARS: CPT | Mod: QW,LT | Performed by: OPHTHALMOLOGY

## 2024-06-19 PROCEDURE — 83861 MICROFLUID ANALY TEARS: CPT | Mod: QW,RT | Performed by: OPHTHALMOLOGY

## 2024-06-19 ASSESSMENT — CONFRONTATIONAL VISUAL FIELD TEST (CVF)
OD_FINDINGS: FULL
OS_FINDINGS: FULL

## 2024-06-19 ASSESSMENT — LID EXAM ASSESSMENTS
OD_BLEPHARITIS: RLL RUL T
OS_BLEPHARITIS: LLL LUL T

## 2024-07-29 ENCOUNTER — OFFICE (OUTPATIENT)
Dept: URBAN - METROPOLITAN AREA CLINIC 102 | Facility: CLINIC | Age: 79
Setting detail: OPHTHALMOLOGY
End: 2024-07-29
Payer: MEDICARE

## 2024-07-29 DIAGNOSIS — E11.9: ICD-10-CM

## 2024-07-29 DIAGNOSIS — H16.221: ICD-10-CM

## 2024-07-29 DIAGNOSIS — H01.001: ICD-10-CM

## 2024-07-29 DIAGNOSIS — H43.393: ICD-10-CM

## 2024-07-29 DIAGNOSIS — H35.3131: ICD-10-CM

## 2024-07-29 PROCEDURE — 68761 CLOSE TEAR DUCT OPENING: CPT | Mod: RT | Performed by: OPHTHALMOLOGY

## 2024-07-29 PROCEDURE — 99213 OFFICE O/P EST LOW 20 MIN: CPT | Mod: 25 | Performed by: OPHTHALMOLOGY

## 2024-07-29 ASSESSMENT — LID EXAM ASSESSMENTS
OS_BLEPHARITIS: LLL LUL T
OD_BLEPHARITIS: RLL RUL T

## 2024-10-02 ENCOUNTER — OFFICE (OUTPATIENT)
Dept: URBAN - METROPOLITAN AREA CLINIC 102 | Facility: CLINIC | Age: 79
Setting detail: OPHTHALMOLOGY
End: 2024-10-02
Payer: MEDICARE

## 2024-10-02 VITALS — HEIGHT: 55 IN

## 2024-10-02 DIAGNOSIS — H16.221: ICD-10-CM

## 2024-10-02 DIAGNOSIS — H01.004: ICD-10-CM

## 2024-10-02 DIAGNOSIS — H16.222: ICD-10-CM

## 2024-10-02 DIAGNOSIS — H01.001: ICD-10-CM

## 2024-10-02 DIAGNOSIS — H16.223: ICD-10-CM

## 2024-10-02 PROCEDURE — 68761 CLOSE TEAR DUCT OPENING: CPT | Mod: 50 | Performed by: OPHTHALMOLOGY

## 2024-10-02 PROCEDURE — 99213 OFFICE O/P EST LOW 20 MIN: CPT | Mod: 25 | Performed by: OPHTHALMOLOGY

## 2024-10-02 PROCEDURE — 83861 MICROFLUID ANALY TEARS: CPT | Mod: QW,LT | Performed by: OPHTHALMOLOGY

## 2024-10-02 PROCEDURE — 83861 MICROFLUID ANALY TEARS: CPT | Mod: QW,RT | Performed by: OPHTHALMOLOGY

## 2024-10-02 ASSESSMENT — REFRACTION_CURRENTRX
OS_AXIS: 123
OD_CYLINDER: -0.50
OS_SPHERE: +2.50
OD_OVR_VA: 20/
OS_CYLINDER: -0.75
OS_OVR_VA: 20/
OD_SPHERE: +3.25
OD_AXIS: 018

## 2024-10-02 ASSESSMENT — REFRACTION_AUTOREFRACTION
OS_CYLINDER: -0.25
OS_SPHERE: +0.50
OD_CYLINDER: -0.50
OS_AXIS: 049
OD_SPHERE: PLANO
OD_AXIS: 018

## 2024-10-02 ASSESSMENT — KERATOMETRY
OS_K2POWER_DIOPTERS: 48.00
OS_K1POWER_DIOPTERS: 46.75
OD_AXISANGLE_DEGREES: 103
OD_K1POWER_DIOPTERS: 47.00
OS_AXISANGLE_DEGREES: 074
OD_K2POWER_DIOPTERS: 48.00
METHOD_AUTO_MANUAL: AUTO

## 2024-10-02 ASSESSMENT — REFRACTION_MANIFEST
OS_AXIS: 000
OD_AXIS: 080
OS_VA1: 20/40
OD_AXIS: 080
OS_SPHERE: -2.75
OD_SPHERE: PLANO
OS_CYLINDER: 0.00
OS_SPHERE: PLANO
OS_VA1: 20/20
OD_CYLINDER: -1.00
OD_CYLINDER: -0.25
OD_VA1: 20/25
OD_SPHERE: -1.00

## 2024-10-02 ASSESSMENT — CORNEAL DYSTROPHY
OS_DYSTROPHY: SUPERIORLY
OD_DYSTROPHY: SUPERIORLY

## 2024-10-02 ASSESSMENT — SUPERFICIAL PUNCTATE KERATITIS (SPK)
OD_SPK: T
OS_SPK: T

## 2024-10-02 ASSESSMENT — VISUAL ACUITY
OD_BCVA: 20/20
OS_BCVA: 20/25-2

## 2024-10-02 ASSESSMENT — TONOMETRY
OS_IOP_MMHG: 11
OD_IOP_MMHG: 13

## 2024-10-02 ASSESSMENT — LID EXAM ASSESSMENTS
OS_BLEPHARITIS: LLL LUL T
OD_BLEPHARITIS: RLL RUL T

## 2024-10-02 ASSESSMENT — CONFRONTATIONAL VISUAL FIELD TEST (CVF)
OS_FINDINGS: FULL
OD_FINDINGS: FULL

## 2024-12-11 ENCOUNTER — OUTPATIENT (OUTPATIENT)
Dept: OUTPATIENT SERVICES | Facility: HOSPITAL | Age: 79
LOS: 1 days | End: 2024-12-11
Payer: MEDICARE

## 2024-12-11 ENCOUNTER — APPOINTMENT (OUTPATIENT)
Dept: CT IMAGING | Facility: CLINIC | Age: 79
End: 2024-12-11
Payer: MEDICARE

## 2024-12-11 DIAGNOSIS — Z90.49 ACQUIRED ABSENCE OF OTHER SPECIFIED PARTS OF DIGESTIVE TRACT: Chronic | ICD-10-CM

## 2024-12-11 DIAGNOSIS — N63.10 UNSPECIFIED LUMP IN THE RIGHT BREAST, UNSPECIFIED QUADRANT: Chronic | ICD-10-CM

## 2024-12-11 DIAGNOSIS — D39.0 NEOPLASM OF UNCERTAIN BEHAVIOR OF UTERUS: ICD-10-CM

## 2024-12-11 DIAGNOSIS — R59.9 ENLARGED LYMPH NODES, UNSPECIFIED: Chronic | ICD-10-CM

## 2024-12-11 PROCEDURE — 74177 CT ABD & PELVIS W/CONTRAST: CPT | Mod: 26,MH

## 2024-12-11 PROCEDURE — 74177 CT ABD & PELVIS W/CONTRAST: CPT

## 2024-12-17 ENCOUNTER — NON-APPOINTMENT (OUTPATIENT)
Age: 79
End: 2024-12-17

## 2024-12-17 ENCOUNTER — APPOINTMENT (OUTPATIENT)
Dept: GYNECOLOGIC ONCOLOGY | Facility: CLINIC | Age: 79
End: 2024-12-17
Payer: MEDICARE

## 2024-12-17 VITALS — WEIGHT: 157 LBS | OXYGEN SATURATION: 98 % | HEIGHT: 64 IN | HEART RATE: 88 BPM | BODY MASS INDEX: 26.8 KG/M2

## 2024-12-17 DIAGNOSIS — D39.0 NEOPLASM OF UNCERTAIN BEHAVIOR OF UTERUS: ICD-10-CM

## 2024-12-17 DIAGNOSIS — R91.1 SOLITARY PULMONARY NODULE: ICD-10-CM

## 2024-12-17 PROCEDURE — 99213 OFFICE O/P EST LOW 20 MIN: CPT

## 2024-12-17 PROCEDURE — 99459 PELVIC EXAMINATION: CPT

## 2024-12-22 ENCOUNTER — NON-APPOINTMENT (OUTPATIENT)
Age: 79
End: 2024-12-22

## 2025-06-03 ENCOUNTER — APPOINTMENT (OUTPATIENT)
Dept: GYNECOLOGIC ONCOLOGY | Facility: CLINIC | Age: 80
End: 2025-06-03
Payer: MEDICARE

## 2025-06-03 VITALS
DIASTOLIC BLOOD PRESSURE: 83 MMHG | HEIGHT: 64 IN | HEART RATE: 89 BPM | OXYGEN SATURATION: 98 % | WEIGHT: 146 LBS | SYSTOLIC BLOOD PRESSURE: 145 MMHG | BODY MASS INDEX: 24.92 KG/M2

## 2025-06-03 DIAGNOSIS — R91.1 SOLITARY PULMONARY NODULE: ICD-10-CM

## 2025-06-03 DIAGNOSIS — D39.0 NEOPLASM OF UNCERTAIN BEHAVIOR OF UTERUS: ICD-10-CM

## 2025-06-03 PROCEDURE — 99213 OFFICE O/P EST LOW 20 MIN: CPT

## 2025-06-03 PROCEDURE — 99459 PELVIC EXAMINATION: CPT

## 2025-08-13 ENCOUNTER — OFFICE (OUTPATIENT)
Dept: URBAN - METROPOLITAN AREA CLINIC 102 | Facility: CLINIC | Age: 80
Setting detail: OPHTHALMOLOGY
End: 2025-08-13
Payer: MEDICARE

## 2025-08-13 DIAGNOSIS — H01.001: ICD-10-CM

## 2025-08-13 DIAGNOSIS — H16.222: ICD-10-CM

## 2025-08-13 DIAGNOSIS — H35.3131: ICD-10-CM

## 2025-08-13 DIAGNOSIS — H16.221: ICD-10-CM

## 2025-08-13 DIAGNOSIS — H16.223: ICD-10-CM

## 2025-08-13 DIAGNOSIS — H35.363: ICD-10-CM

## 2025-08-13 DIAGNOSIS — H43.393: ICD-10-CM

## 2025-08-13 DIAGNOSIS — E11.9: ICD-10-CM

## 2025-08-13 PROCEDURE — 83861 MICROFLUID ANALY TEARS: CPT | Mod: QW,RT | Performed by: OPHTHALMOLOGY

## 2025-08-13 PROCEDURE — 92014 COMPRE OPH EXAM EST PT 1/>: CPT | Mod: 25 | Performed by: OPHTHALMOLOGY

## 2025-08-13 PROCEDURE — 68761 CLOSE TEAR DUCT OPENING: CPT | Mod: 50 | Performed by: OPHTHALMOLOGY

## 2025-08-13 PROCEDURE — 83861 MICROFLUID ANALY TEARS: CPT | Mod: QW,LT | Performed by: OPHTHALMOLOGY

## 2025-08-13 PROCEDURE — 92134 CPTRZ OPH DX IMG PST SGM RTA: CPT | Performed by: OPHTHALMOLOGY

## 2025-08-13 ASSESSMENT — LID EXAM ASSESSMENTS
OD_BLEPHARITIS: RLL RUL T
OS_BLEPHARITIS: LLL LUL T

## 2025-08-13 ASSESSMENT — TONOMETRY
OD_IOP_MMHG: 15
OS_IOP_MMHG: 16

## 2025-08-13 ASSESSMENT — SUPERFICIAL PUNCTATE KERATITIS (SPK)
OS_SPK: T
OD_SPK: T

## 2025-08-13 ASSESSMENT — CONFRONTATIONAL VISUAL FIELD TEST (CVF)
OD_FINDINGS: FULL
OS_FINDINGS: FULL

## 2025-08-13 ASSESSMENT — CORNEAL DYSTROPHY
OD_DYSTROPHY: SUPERIORLY
OS_DYSTROPHY: SUPERIORLY

## 2025-08-14 ASSESSMENT — REFRACTION_MANIFEST
OS_VA1: 20/40
OS_AXIS: 000
OS_VA1: 20/20
OS_SPHERE: -2.75
OS_CYLINDER: 0.00
OD_SPHERE: -1.00
OD_CYLINDER: -0.25
OD_VA1: 20/25
OD_CYLINDER: -1.00
OD_AXIS: 080
OS_SPHERE: PLANO
OD_AXIS: 080
OD_SPHERE: PLANO

## 2025-08-14 ASSESSMENT — KERATOMETRY
OS_K2POWER_DIOPTERS: 48.00
METHOD_AUTO_MANUAL: AUTO
OS_K1POWER_DIOPTERS: 46.75
OD_K2POWER_DIOPTERS: 47.75
OD_AXISANGLE_DEGREES: 100
OS_AXISANGLE_DEGREES: 075
OD_K1POWER_DIOPTERS: 46.75

## 2025-08-14 ASSESSMENT — VISUAL ACUITY
OD_BCVA: 20/20
OS_BCVA: 20/20-2

## 2025-08-14 ASSESSMENT — REFRACTION_CURRENTRX
OD_OVR_VA: 20/
OS_SPHERE: +2.50
OS_AXIS: 123
OS_CYLINDER: -0.75
OD_AXIS: 018
OS_OVR_VA: 20/
OD_SPHERE: +3.25
OD_CYLINDER: -0.50

## 2025-08-14 ASSESSMENT — REFRACTION_AUTOREFRACTION
OD_SPHERE: PLANO
OD_CYLINDER: -0.25
OD_AXIS: 030
OS_AXIS: 075
OS_CYLINDER: -0.50
OS_SPHERE: PLANO